# Patient Record
Sex: FEMALE | Race: WHITE | NOT HISPANIC OR LATINO | Employment: OTHER | URBAN - METROPOLITAN AREA
[De-identification: names, ages, dates, MRNs, and addresses within clinical notes are randomized per-mention and may not be internally consistent; named-entity substitution may affect disease eponyms.]

---

## 2019-11-06 ENCOUNTER — OFFICE VISIT (OUTPATIENT)
Dept: FAMILY MEDICINE CLINIC | Facility: HOSPITAL | Age: 84
End: 2019-11-06
Payer: COMMERCIAL

## 2019-11-06 VITALS
HEIGHT: 62 IN | WEIGHT: 159 LBS | SYSTOLIC BLOOD PRESSURE: 140 MMHG | TEMPERATURE: 97.3 F | BODY MASS INDEX: 29.26 KG/M2 | HEART RATE: 72 BPM | DIASTOLIC BLOOD PRESSURE: 78 MMHG

## 2019-11-06 DIAGNOSIS — E78.5 DYSLIPIDEMIA: ICD-10-CM

## 2019-11-06 DIAGNOSIS — Z01.818 PRE-OP EXAM: Primary | ICD-10-CM

## 2019-11-06 DIAGNOSIS — R94.31 ABNORMAL EKG: ICD-10-CM

## 2019-11-06 DIAGNOSIS — H25.13 AGE-RELATED NUCLEAR CATARACT OF BOTH EYES: ICD-10-CM

## 2019-11-06 DIAGNOSIS — Z23 ENCOUNTER FOR IMMUNIZATION: ICD-10-CM

## 2019-11-06 DIAGNOSIS — I10 ESSENTIAL HYPERTENSION: ICD-10-CM

## 2019-11-06 PROCEDURE — G0008 ADMIN INFLUENZA VIRUS VAC: HCPCS | Performed by: FAMILY MEDICINE

## 2019-11-06 PROCEDURE — 90662 IIV NO PRSV INCREASED AG IM: CPT | Performed by: FAMILY MEDICINE

## 2019-11-06 PROCEDURE — 99213 OFFICE O/P EST LOW 20 MIN: CPT | Performed by: FAMILY MEDICINE

## 2019-11-06 RX ORDER — LANOLIN ALCOHOL/MO/W.PET/CERES
1 CREAM (GRAM) TOPICAL
COMMUNITY
End: 2022-06-07 | Stop reason: ALTCHOICE

## 2019-11-06 RX ORDER — SIMVASTATIN 10 MG
1 TABLET ORAL
COMMUNITY
Start: 2015-01-15 | End: 2020-05-13 | Stop reason: SDUPTHER

## 2019-11-06 RX ORDER — LISINOPRIL 10 MG/1
1 TABLET ORAL DAILY
COMMUNITY
Start: 2015-01-15 | End: 2020-05-13 | Stop reason: SDUPTHER

## 2019-11-06 RX ORDER — DOCUSATE SODIUM 100 MG/1
100 CAPSULE, LIQUID FILLED ORAL
COMMUNITY
Start: 2017-11-02 | End: 2020-03-11 | Stop reason: ALTCHOICE

## 2019-11-06 NOTE — PROGRESS NOTES
Assessment/Plan:         Diagnoses and all orders for this visit:    Pre-op exam  -     POCT ECG    Age-related nuclear cataract of both eyes  Comments:  She is medically cleared for ctaract extraction and IOL first OS then OD  EKG abnormal but unchanged from tracing from 2014  Abnormal EKG  Comments:  Low voltage anterior leads, unchanged from 2014    Dyslipidemia  Comments:  Continue statin and recheck lipid profile in the New Year  Orders:  -     Lipid Panel with Direct LDL reflex; Future    Essential hypertension  Comments:  Excellent BP control  Orders:  -     CBC and differential; Future  -     Comprehensive metabolic panel; Future  -     TSH, 3rd generation with Free T4 reflex; Future    Encounter for immunization  -     influenza vaccine, [id number], high-dose, PF 0 5 mL (FLUZONE HIGH-DOSE)    Other orders  -     simvastatin (ZOCOR) 10 mg tablet; Take 1 tablet by mouth  -     lisinopril (ZESTRIL) 10 mg tablet; Take 1 tablet by mouth daily  -     Multiple Vitamins-Minerals (MULTIVITAMIN ADULT PO); Take 1 tablet by mouth  -     Methylsulfonylmethane 1000 MG CAPS; Take 1 tablet by mouth  -     docusate sodium (COLACE) 100 mg capsule; Take 100 mg by mouth  -     calcium citrate-vitamin D (CITRACAL+D) 315-200 MG-UNIT per tablet; Take 1 tablet by mouth  -     B COMPLEX VITAMINS PO; Take 1 tablet by mouth  -     aspirin 81 MG tablet; Take 1 tablet by mouth daily          Subjective:      Patient ID: Gracy Mccartney is a 80 y o  female  New patient preop for cataract surgery    Feeling well overall    To have OS first , then OD with Dr Angélica Cruz  Some incontinence but not overly bothersome  Offered trial of medication, deferred by her at this time   since last year    from cardiac conduction problem and she continues to grieve and carry upset        The following portions of the patient's history were reviewed and updated as appropriate: allergies, current medications, past family history, past medical history, past social history, past surgical history and problem list     Review of Systems   Constitutional: Negative for unexpected weight change  HENT: Negative  Eyes: Positive for visual disturbance  Respiratory: Negative  Cardiovascular: Negative  Gastrointestinal: Negative  Genitourinary: Positive for enuresis, frequency and urgency  Musculoskeletal: Negative  Neurological: Negative  Psychiatric/Behavioral: Negative  All other systems reviewed and are negative  Objective:      /78   Pulse 72   Temp (!) 97 3 °F (36 3 °C)   Ht 5' 2" (1 575 m)   Wt 72 1 kg (159 lb)   BMI 29 08 kg/m²          Physical Exam   Constitutional: She is oriented to person, place, and time  She appears well-developed and well-nourished  HENT:   Right Ear: External ear normal    Left Ear: External ear normal    Nose: Nose normal    Mouth/Throat: Oropharynx is clear and moist    Eyes:   Bilateral mature cataracts  Neck: No thyromegaly present  Cardiovascular: Normal rate, regular rhythm, normal heart sounds and intact distal pulses  Pulmonary/Chest: Effort normal and breath sounds normal    Abdominal: Soft  Bowel sounds are normal    Musculoskeletal: She exhibits no edema  Neurological: She is alert and oriented to person, place, and time  Skin: No rash noted  Psychiatric: She has a normal mood and affect  Her behavior is normal    Nursing note and vitals reviewed

## 2019-11-07 PROBLEM — R94.31 ABNORMAL EKG: Status: ACTIVE | Noted: 2019-11-07

## 2019-11-07 PROBLEM — Z23 ENCOUNTER FOR IMMUNIZATION: Status: ACTIVE | Noted: 2019-11-07

## 2019-11-07 PROCEDURE — 93000 ELECTROCARDIOGRAM COMPLETE: CPT | Performed by: FAMILY MEDICINE

## 2020-01-31 ENCOUNTER — OFFICE VISIT (OUTPATIENT)
Dept: FAMILY MEDICINE CLINIC | Facility: HOSPITAL | Age: 85
End: 2020-01-31
Payer: COMMERCIAL

## 2020-01-31 VITALS
TEMPERATURE: 97.5 F | BODY MASS INDEX: 29.63 KG/M2 | WEIGHT: 161 LBS | HEART RATE: 60 BPM | SYSTOLIC BLOOD PRESSURE: 120 MMHG | DIASTOLIC BLOOD PRESSURE: 72 MMHG | HEIGHT: 62 IN

## 2020-01-31 DIAGNOSIS — Z01.818 PRE-OP EXAM: ICD-10-CM

## 2020-01-31 DIAGNOSIS — H25.11 AGE-RELATED NUCLEAR CATARACT OF RIGHT EYE: Primary | ICD-10-CM

## 2020-01-31 DIAGNOSIS — I10 ESSENTIAL HYPERTENSION: ICD-10-CM

## 2020-01-31 PROCEDURE — 99213 OFFICE O/P EST LOW 20 MIN: CPT | Performed by: FAMILY MEDICINE

## 2020-01-31 PROCEDURE — 3078F DIAST BP <80 MM HG: CPT | Performed by: FAMILY MEDICINE

## 2020-01-31 PROCEDURE — 3074F SYST BP LT 130 MM HG: CPT | Performed by: FAMILY MEDICINE

## 2020-01-31 PROCEDURE — 1100F PTFALLS ASSESS-DOCD GE2>/YR: CPT | Performed by: FAMILY MEDICINE

## 2020-01-31 PROCEDURE — 1036F TOBACCO NON-USER: CPT | Performed by: FAMILY MEDICINE

## 2020-01-31 PROCEDURE — 3288F FALL RISK ASSESSMENT DOCD: CPT | Performed by: FAMILY MEDICINE

## 2020-01-31 PROCEDURE — 3725F SCREEN DEPRESSION PERFORMED: CPT | Performed by: FAMILY MEDICINE

## 2020-01-31 PROCEDURE — 1160F RVW MEDS BY RX/DR IN RCRD: CPT | Performed by: FAMILY MEDICINE

## 2020-01-31 NOTE — PROGRESS NOTES
BMI Counseling: Body mass index is 29 45 kg/m²  The BMI is above normal  Nutrition recommendations include reducing portion sizes, decreasing overall calorie intake and moderation in carbohydrate intake  Exercise recommendations include strength training exercises  Assessment/Plan:    1  Age-related nuclear cataract of right eye     Medically cleared for cat extraction OD with IOL by Dr Dionne Leger    2  Preop exam    3  Essential hypertension      Excellent BP control       addendum:   EKG done 11/6/19 was acceptable and does not need to be repeated  Subjective:      Patient ID: Jamia Redman is a 80 y o  female  For preop check for second cataract surgery OD with Dr Dionne Leger on 2/11/20    Had a very good experience with the OS surgery  No recent illness or injury          The following portions of the patient's history were reviewed and updated as appropriate: allergies, current medications, past family history, past medical history, past social history, past surgical history and problem list     Review of Systems   Constitutional: Negative for unexpected weight change  HENT: Negative  Eyes: Positive for visual disturbance  Respiratory: Negative  Cardiovascular: Negative  Gastrointestinal: Negative  Musculoskeletal: Negative  Neurological: Negative  Hematological: Negative  Psychiatric/Behavioral: Negative  All other systems reviewed and are negative  Objective:      /72   Pulse 60   Temp 97 5 °F (36 4 °C)   Ht 5' 2" (1 575 m)   Wt 73 kg (161 lb)   BMI 29 45 kg/m²          Physical Exam   Constitutional: She is oriented to person, place, and time  She appears well-developed and well-nourished  HENT:   Head: Normocephalic  Right Ear: External ear normal    Left Ear: External ear normal    Nose: Nose normal    Mouth/Throat: Oropharynx is clear and moist    Neck: No thyromegaly present  Cardiovascular: Normal rate, regular rhythm and normal heart sounds  Pulmonary/Chest: Effort normal and breath sounds normal    Musculoskeletal: She exhibits no edema  Neurological: She is alert and oriented to person, place, and time  Psychiatric: She has a normal mood and affect  Her behavior is normal  Judgment and thought content normal    Nursing note and vitals reviewed

## 2020-03-11 ENCOUNTER — OFFICE VISIT (OUTPATIENT)
Dept: FAMILY MEDICINE CLINIC | Facility: HOSPITAL | Age: 85
End: 2020-03-11
Payer: COMMERCIAL

## 2020-03-11 VITALS
HEART RATE: 70 BPM | DIASTOLIC BLOOD PRESSURE: 84 MMHG | SYSTOLIC BLOOD PRESSURE: 142 MMHG | HEIGHT: 62 IN | TEMPERATURE: 97.9 F | BODY MASS INDEX: 29.81 KG/M2 | WEIGHT: 162 LBS

## 2020-03-11 DIAGNOSIS — E78.5 DYSLIPIDEMIA: ICD-10-CM

## 2020-03-11 DIAGNOSIS — Z00.00 MEDICARE ANNUAL WELLNESS VISIT, SUBSEQUENT: Primary | ICD-10-CM

## 2020-03-11 DIAGNOSIS — I10 ESSENTIAL HYPERTENSION: ICD-10-CM

## 2020-03-11 PROCEDURE — 3079F DIAST BP 80-89 MM HG: CPT | Performed by: FAMILY MEDICINE

## 2020-03-11 PROCEDURE — 3077F SYST BP >= 140 MM HG: CPT | Performed by: FAMILY MEDICINE

## 2020-03-11 PROCEDURE — G0439 PPPS, SUBSEQ VISIT: HCPCS | Performed by: FAMILY MEDICINE

## 2020-03-11 PROCEDURE — 1160F RVW MEDS BY RX/DR IN RCRD: CPT | Performed by: FAMILY MEDICINE

## 2020-03-11 PROCEDURE — 1125F AMNT PAIN NOTED PAIN PRSNT: CPT | Performed by: FAMILY MEDICINE

## 2020-03-11 PROCEDURE — 4040F PNEUMOC VAC/ADMIN/RCVD: CPT | Performed by: FAMILY MEDICINE

## 2020-03-11 PROCEDURE — 1170F FXNL STATUS ASSESSED: CPT | Performed by: FAMILY MEDICINE

## 2020-03-11 PROCEDURE — 1036F TOBACCO NON-USER: CPT | Performed by: FAMILY MEDICINE

## 2020-03-11 RX ORDER — KETOROLAC TROMETHAMINE 5 MG/ML
SOLUTION OPHTHALMIC
COMMUNITY
Start: 2020-02-03 | End: 2022-06-07 | Stop reason: ALTCHOICE

## 2020-03-11 NOTE — PROGRESS NOTES
Assessment/Plan:         Diagnoses and all orders for this visit:    Medicare annual wellness visit, subsequent    Dyslipidemia  -     Lipid Panel with Direct LDL reflex; Future  -     Lipid Panel with Direct LDL reflex    Essential hypertension  -     CBC and differential; Future  -     Comprehensive metabolic panel; Future  -     TSH, 3rd generation with Free T4 reflex; Future  -     CBC and differential  -     Comprehensive metabolic panel  -     TSH, 3rd generation with Free T4 reflex    Other orders  -     ketorolac (ACULAR) 0 5 % ophthalmic solution  -     Multiple Vitamins-Minerals (PRESERVISION AREDS PO); Take by mouth 2 (two) times a day          Subjective:      Patient ID: Dedra Miranda is a 80 y o  female  Medicare well visit    Had both cataracts surgically done  Awaiting glasses     No recent illness or injury  Knees are main problem but not up for surgery  The following portions of the patient's history were reviewed and updated as appropriate: allergies, current medications, past family history, past medical history, past social history, past surgical history and problem list     Review of Systems      Objective:      /84   Pulse 70   Temp 97 9 °F (36 6 °C)   Ht 5' 2" (1 575 m)   Wt 73 5 kg (162 lb)   BMI 29 63 kg/m²          Physical Exam   Constitutional: She is oriented to person, place, and time  She appears well-developed and well-nourished  HENT:   Head: Normocephalic and atraumatic  Right Ear: External ear normal    Left Ear: External ear normal    Neck: No thyromegaly present  Cardiovascular: Normal rate, regular rhythm, normal heart sounds and intact distal pulses  Pulmonary/Chest: Effort normal and breath sounds normal    Neurological: She is alert and oriented to person, place, and time  Psychiatric: She has a normal mood and affect  Her behavior is normal  Thought content normal    Nursing note and vitals reviewed

## 2020-03-11 NOTE — PATIENT INSTRUCTIONS
Medicare Preventive Visit Patient Instructions  Thank you for completing your Welcome to Medicare Visit or Medicare Annual Wellness Visit today  Your next wellness visit will be due in one year (3/11/2021)  The screening/preventive services that you may require over the next 5-10 years are detailed below  Some tests may not apply to you based off risk factors and/or age  Screening tests ordered at today's visit but not completed yet may show as past due  Also, please note that scanned in results may not display below  Preventive Screenings:  Service Recommendations Previous Testing/Comments   Colorectal Cancer Screening  * Colonoscopy    * Fecal Occult Blood Test (FOBT)/Fecal Immunochemical Test (FIT)  * Fecal DNA/Cologuard Test  * Flexible Sigmoidoscopy Age: 54-65 years old   Colonoscopy: every 10 years (may be performed more frequently if at higher risk)  OR  FOBT/FIT: every 1 year  OR  Cologuard: every 3 years  OR  Sigmoidoscopy: every 5 years  Screening may be recommended earlier than age 48 if at higher risk for colorectal cancer  Also, an individualized decision between you and your healthcare provider will decide whether screening between the ages of 74-80 would be appropriate  Colonoscopy: Not on file  FOBT/FIT: Not on file  Cologuard: Not on file  Sigmoidoscopy: Not on file         Breast Cancer Screening Age: 36 years old  Frequency: every 1-2 years  Not required if history of left and right mastectomy Mammogram: Not on file    History Breast Cancer   Cervical Cancer Screening Between the ages of 21-29, pap smear recommended once every 3 years  Between the ages of 33-67, can perform pap smear with HPV co-testing every 5 years     Recommendations may differ for women with a history of total hysterectomy, cervical cancer, or abnormal pap smears in past  Pap Smear: Not on file    Screening Not Indicated   Hepatitis C Screening Once for adults born between 1945 and 1965  More frequently in patients at high risk for Hepatitis C Hep C Antibody: Not on file       Diabetes Screening 1-2 times per year if you're at risk for diabetes or have pre-diabetes Fasting glucose: No results in last 5 years   A1C: No results in last 5 years       Cholesterol Screening Once every 5 years if you don't have a lipid disorder  May order more often based on risk factors  Lipid panel: Not on file         Other Preventive Screenings Covered by Medicare:  1  Abdominal Aortic Aneurysm (AAA) Screening: covered once if your at risk  You're considered to be at risk if you have a family history of AAA  2  Lung Cancer Screening: covers low dose CT scan once per year if you meet all of the following conditions: (1) Age 50-69; (2) No signs or symptoms of lung cancer; (3) Current smoker or have quit smoking within the last 15 years; (4) You have a tobacco smoking history of at least 30 pack years (packs per day multiplied by number of years you smoked); (5) You get a written order from a healthcare provider  3  Glaucoma Screening: covered annually if you're considered high risk: (1) You have diabetes OR (2) Family history of glaucoma OR (3)  aged 48 and older OR (3)  American aged 72 and older  3  Osteoporosis Screening: covered every 2 years if you meet one of the following conditions: (1) You're estrogen deficient and at risk for osteoporosis based off medical history and other findings; (2) Have a vertebral abnormality; (3) On glucocorticoid therapy for more than 3 months; (4) Have primary hyperparathyroidism; (5) On osteoporosis medications and need to assess response to drug therapy  · Last bone density test (DXA Scan): Not on file  5  HIV Screening: covered annually if you're between the age of 12-76  Also covered annually if you are younger than 13 and older than 72 with risk factors for HIV infection  For pregnant patients, it is covered up to 3 times per pregnancy      Immunizations:  Immunization Recommendations   Influenza Vaccine Annual influenza vaccination during flu season is recommended for all persons aged >= 6 months who do not have contraindications   Pneumococcal Vaccine (Prevnar and Pneumovax)  * Prevnar = PCV13  * Pneumovax = PPSV23   Adults 25-60 years old: 1-3 doses may be recommended based on certain risk factors  Adults 72 years old: Prevnar (PCV13) vaccine recommended followed by Pneumovax (PPSV23) vaccine  If already received PPSV23 since turning 65, then PCV13 recommended at least one year after PPSV23 dose  Hepatitis B Vaccine 3 dose series if at intermediate or high risk (ex: diabetes, end stage renal disease, liver disease)   Tetanus (Td) Vaccine - COST NOT COVERED BY MEDICARE PART B Following completion of primary series, a booster dose should be given every 10 years to maintain immunity against tetanus  Td may also be given as tetanus wound prophylaxis  Tdap Vaccine - COST NOT COVERED BY MEDICARE PART B Recommended at least once for all adults  For pregnant patients, recommended with each pregnancy  Shingles Vaccine (Shingrix) - COST NOT COVERED BY MEDICARE PART B  2 shot series recommended in those aged 48 and above     Health Maintenance Due:      Topic Date Due    DXA SCAN  09/11/2020 (Originally 1935)     Immunizations Due:  There are no preventive care reminders to display for this patient  Advance Directives   What are advance directives? Advance directives are legal documents that state your wishes and plans for medical care  These plans are made ahead of time in case you lose your ability to make decisions for yourself  Advance directives can apply to any medical decision, such as the treatments you want, and if you want to donate organs  What are the types of advance directives? There are many types of advance directives, and each state has rules about how to use them  You may choose a combination of any of the following:  · Living will:   This is a written record of the treatment you want  You can also choose which treatments you do not want, which to limit, and which to stop at a certain time  This includes surgery, medicine, IV fluid, and tube feedings  · Durable power of  for healthcare [facility]): This is a written record that states who you want to make healthcare choices for you when you are unable to make them for yourself  This person, called a proxy, is usually a family member or a friend  You may choose more than 1 proxy  · Do not resuscitate (DNR) order:  A DNR order is used in case your heart stops beating or you stop breathing  It is a request not to have certain forms of treatment, such as CPR  A DNR order may be included in other types of advance directives  · Medical directive: This covers the care that you want if you are in a coma, near death, or unable to make decisions for yourself  You can list the treatments you want for each condition  Treatment may include pain medicine, surgery, blood transfusions, dialysis, IV or tube feedings, and a ventilator (breathing machine)  · Values history: This document has questions about your views, beliefs, and how you feel and think about life  This information can help others choose the care that you would choose  Why are advance directives important? An advance directive helps you control your care  Although spoken wishes may be used, it is better to have your wishes written down  Spoken wishes can be misunderstood, or not followed  Treatments may be given even if you do not want them  An advance directive may make it easier for your family to make difficult choices about your care  Urinary Incontinence   Urinary incontinence (UI)  is when you lose control of your bladder  UI develops because your bladder cannot store or empty urine properly  The 3 most common types of UI are stress incontinence, urge incontinence, or both  Medicines:   · May be given to help strengthen your bladder control  Report any side effects of medication to your healthcare provider  Do pelvic muscle exercises often:  Your pelvic muscles help you stop urinating  Squeeze these muscles tight for 5 seconds, then relax for 5 seconds  Gradually work up to squeezing for 10 seconds  Do 3 sets of 15 repetitions a day, or as directed  This will help strengthen your pelvic muscles and improve bladder control  Train your bladder:  Go to the bathroom at set times, such as every 2 hours, even if you do not feel the urge to go  You can also try to hold your urine when you feel the urge to go  For example, hold your urine for 5 minutes when you feel the urge to go  As that becomes easier, hold your urine for 10 minutes  Self-care:   · Keep a UI record  Write down how often you leak urine and how much you leak  Make a note of what you were doing when you leaked urine  · Drink liquids as directed  You may need to limit the amount of liquid you drink to help control your urine leakage  Do not drink any liquid right before you go to bed  Limit or do not have drinks that contain caffeine or alcohol  · Prevent constipation  Eat a variety of high-fiber foods  Good examples are high-fiber cereals, beans, vegetables, and whole-grain breads  Walking is the best way to trigger your intestines to have a bowel movement  · Exercise regularly and maintain a healthy weight  Weight loss and exercise will decrease pressure on your bladder and help you control your leakage  · Use a catheter as directed  to help empty your bladder  A catheter is a tiny, plastic tube that is put into your bladder to drain your urine  · Go to behavior therapy as directed  Behavior therapy may be used to help you learn to control your urge to urinate  Weight Management   Why it is important to manage your weight:  Being overweight increases your risk of health conditions such as heart disease, high blood pressure, type 2 diabetes, and certain types of cancer   It can also increase your risk for osteoarthritis, sleep apnea, and other respiratory problems  Aim for a slow, steady weight loss  Even a small amount of weight loss can lower your risk of health problems  How to lose weight safely:  A safe and healthy way to lose weight is to eat fewer calories and get regular exercise  You can lose up about 1 pound a week by decreasing the number of calories you eat by 500 calories each day  Healthy meal plan for weight management:  A healthy meal plan includes a variety of foods, contains fewer calories, and helps you stay healthy  A healthy meal plan includes the following:  · Eat whole-grain foods more often  A healthy meal plan should contain fiber  Fiber is the part of grains, fruits, and vegetables that is not broken down by your body  Whole-grain foods are healthy and provide extra fiber in your diet  Some examples of whole-grain foods are whole-wheat breads and pastas, oatmeal, brown rice, and bulgur  · Eat a variety of vegetables every day  Include dark, leafy greens such as spinach, kale, chandler greens, and mustard greens  Eat yellow and orange vegetables such as carrots, sweet potatoes, and winter squash  · Eat a variety of fruits every day  Choose fresh or canned fruit (canned in its own juice or light syrup) instead of juice  Fruit juice has very little or no fiber  · Eat low-fat dairy foods  Drink fat-free (skim) milk or 1% milk  Eat fat-free yogurt and low-fat cottage cheese  Try low-fat cheeses such as mozzarella and other reduced-fat cheeses  · Choose meat and other protein foods that are low in fat  Choose beans or other legumes such as split peas or lentils  Choose fish, skinless poultry (chicken or turkey), or lean cuts of red meat (beef or pork)  Before you cook meat or poultry, cut off any visible fat  · Use less fat and oil  Try baking foods instead of frying them   Add less fat, such as margarine, sour cream, regular salad dressing and mayonnaise to foods  Eat fewer high-fat foods  Some examples of high-fat foods include french fries, doughnuts, ice cream, and cakes  · Eat fewer sweets  Limit foods and drinks that are high in sugar  This includes candy, cookies, regular soda, and sweetened drinks  Exercise:  Exercise at least 30 minutes per day on most days of the week  Some examples of exercise include walking, biking, dancing, and swimming  You can also fit in more physical activity by taking the stairs instead of the elevator or parking farther away from stores  Ask your healthcare provider about the best exercise plan for you  © Copyright [employer] 2018 Information is for End User's use only and may not be sold, redistributed or otherwise used for commercial purposes  All illustrations and images included in CareNotes® are the copyrighted property of [employer]  or [employer] Visit for Adults   AMBULATORY CARE:   A wellness visit  is when you see your healthcare provider to get screened for health problems  You can also get advice on how to stay healthy  Write down your questions so you remember to ask them  Ask your healthcare provider how often you should have a wellness visit  What happens at a wellness visit:  Your healthcare provider will ask about your health, and your family history of health problems  This includes high blood pressure, heart disease, and cancer  He or she will ask if you have symptoms that concern you, if you smoke, and about your mood  You may also be asked about your intake of medicines, supplements, food, and alcohol  Any of the following may be done:  · Your weight  will be checked  Your height may also be checked so your body mass index (BMI) can be calculated  Your BMI shows if you are at a healthy weight  · Your blood pressure  and heart rate will be checked  Your temperature may also be checked  · Blood and urine tests  may be done   Blood tests may be done to check your cholesterol levels  Abnormal cholesterol levels increase your risk for heart disease and stroke  You may also need a blood or urine test to check for diabetes if you are at increased risk  Urine tests may be done to look for signs of an infection or kidney disease  · A physical exam  includes checking your heartbeat and lungs with a stethoscope  Your healthcare provider may also check your skin to look for sun damage  · Screening tests  may be recommended  A screening test is done to check for diseases that may not cause symptoms  The screening tests you may need depend on your age, gender, family history, and lifestyle habits  For example, colorectal screening may be recommended if you are 48years old or older  Screening tests you need if you are a woman:   · A Pap smear  is used to screen for cervical cancer  Pap smears are usually done every 3 to 5 years depending on your age  You may need them more often if you have had abnormal Pap smear test results in the past  Ask your healthcare provider how often you should have a Pap smear  · A mammogram  is an x-ray of your breasts to screen for breast cancer  Experts recommend mammograms every 2 years starting at age 48 years  You may need a mammogram at age 52 years or younger if you have an increased risk for breast cancer  Talk to your healthcare provider about when you should start having mammograms and how often you need them  Vaccines you may need:   · Get an influenza vaccine  every year  The influenza vaccine protects you from the flu  Several types of viruses cause the flu  The viruses change over time, so new vaccines are made each year  · Get a tetanus-diphtheria (Td) booster vaccine  every 10 years  This vaccine protects you against tetanus and diphtheria  Tetanus is a severe infection that may cause painful muscle spasms and lockjaw   Diphtheria is a severe bacterial infection that causes a thick covering in the back of your mouth and throat  · Get a human papillomavirus (HPV) vaccine  if you are female and aged 23 to 32 or male 23 to 24 and never received it  This vaccine protects you from HPV infection  HPV is the most common infection spread by sexual contact  HPV may also cause vaginal, penile, and anal cancers  · Get a pneumococcal vaccine  if you are aged 72 years or older  The pneumococcal vaccine is an injection given to protect you from pneumococcal disease  Pneumococcal disease is an infection caused by pneumococcal bacteria  The infection may cause pneumonia, meningitis, or an ear infection  · Get a shingles vaccine  if you are aged 61 or older, even if you have had shingles before  The shingles vaccine is an injection to protect you from the varicella-zoster virus  This is the same virus that causes chickenpox  Shingles is a painful rash that develops in people who had chickenpox or have been exposed to the virus  How to eat healthy:  My Plate is a model for planning healthy meals  It shows the types and amounts of foods that should go on your plate  Fruits and vegetables make up about half of your plate, and grains and protein make up the other half  A serving of dairy is included on the side of your plate  The amount of calories and serving sizes you need depends on your age, gender, weight, and height  Examples of healthy foods are listed below:  · Eat a variety of vegetables  such as dark green, red, and orange vegetables  You can also include canned vegetables low in sodium (salt) and frozen vegetables without added butter or sauces  · Eat a variety of fresh fruits , canned fruit in 100% juice, frozen fruit, and dried fruit  · Include whole grains  At least half of the grains you eat should be whole grains   Examples include whole-wheat bread, wheat pasta, brown rice, and whole-grain cereals such as oatmeal     · Eat a variety of protein foods such as seafood (fish and shellfish), lean meat, and poultry without skin (turkey and chicken)  Examples of lean meats include pork leg, shoulder, or tenderloin, and beef round, sirloin, tenderloin, and extra lean ground beef  Other protein foods include eggs and egg substitutes, beans, peas, soy products, nuts, and seeds  · Choose low-fat dairy products such as skim or 1% milk or low-fat yogurt, cheese, and cottage cheese  · Limit unhealthy fats  such as butter, hard margarine, and shortening  Exercise:  Exercise at least 30 minutes per day on most days of the week  Some examples of exercise include walking, biking, dancing, and swimming  You can also fit in more physical activity by taking the stairs instead of the elevator or parking farther away from stores  Include muscle strengthening activities 2 days each week  Regular exercise provides many health benefits  It helps you manage your weight, and decreases your risk for type 2 diabetes, heart disease, stroke, and high blood pressure  Exercise can also help improve your mood  Ask your healthcare provider about the best exercise plan for you  General health and safety guidelines:   · Do not smoke  Nicotine and other chemicals in cigarettes and cigars can cause lung damage  Ask your healthcare provider for information if you currently smoke and need help to quit  E-cigarettes or smokeless tobacco still contain nicotine  Talk to your healthcare provider before you use these products  · Limit alcohol  A drink of alcohol is 12 ounces of beer, 5 ounces of wine, or 1½ ounces of liquor  · Lose weight, if needed  Being overweight increases your risk of certain health conditions  These include heart disease, high blood pressure, type 2 diabetes, and certain types of cancer  · Protect your skin  Do not sunbathe or use tanning beds  Use sunscreen with a SPF 15 or higher  Apply sunscreen at least 15 minutes before you go outside  Reapply sunscreen every 2 hours   Wear protective clothing, hats, and sunglasses when you are outside  · Drive safely  Always wear your seatbelt  Make sure everyone in your car wears a seatbelt  A seatbelt can save your life if you are in an accident  Do not use your cell phone when you are driving  This could distract you and cause an accident  Pull over if you need to make a call or send a text message  · Practice safe sex  Use latex condoms if are sexually active and have more than one partner  Your healthcare provider may recommend screening tests for sexually transmitted infections (STIs)  · Wear helmets, lifejackets, and protective gear  Always wear a helmet when you ride a bike or motorcycle, go skiing, or play sports that could cause a head injury  Wear protective equipment when you play sports  Wear a lifejacket when you are on a boat or doing water sports  © 2017 [address] Information is for End User's use only and may not be sold, redistributed or otherwise used for commercial purposes  All illustrations and images included in CareNotes® are the copyrighted property of [employer]  or [employer]  The above information is an  only  It is not intended as medical advice for individual conditions or treatments  Talk to your doctor, nurse or pharmacist before following any medical regimen to see if it is safe and effective for you

## 2020-03-11 NOTE — PROGRESS NOTES
Assessment and Plan:     Problem List Items Addressed This Visit        Cardiovascular and Mediastinum    Essential hypertension    Relevant Orders    CBC and differential    Comprehensive metabolic panel    TSH, 3rd generation with Free T4 reflex       Other    Dyslipidemia    Relevant Orders    Lipid Panel with Direct LDL reflex    Medicare annual wellness visit, subsequent - Primary          Falls Plan of Care: balance, strength, and gait training instructions were provided  Preventive health issues were discussed with patient, and age appropriate screening tests were ordered as noted in patient's After Visit Summary  Personalized health advice and appropriate referrals for health education or preventive services given if needed, as noted in patient's After Visit Summary       History of Present Illness:     Patient presents for Medicare Annual Wellness visit    Patient Care Team:  Jayleen Hand MD as PCP - General (Family Medicine)  Reginald Miner MD     Problem List:     Patient Active Problem List   Diagnosis    Dyslipidemia    Essential hypertension    Pre-op exam    Age-related nuclear cataract of right eye    Abnormal EKG    Medicare annual wellness visit, subsequent      Past Medical and Surgical History:     Past Medical History:   Diagnosis Date    Breast cancer ([address] )     Right sided     Past Surgical History:   Procedure Laterality Date    BREAST SURGERY      EYE SURGERY  11/19/2019    Left cataract       Family History:     Family History   Problem Relation Age of Onset    No Known Problems Family     Coronary artery disease Father       Social History:        Social History     Socioeconomic History    Marital status: /Civil Union     Spouse name: None    Number of children: None    Years of education: None    Highest education level: None   Occupational History    None   Social Needs    Financial resource strain: None    Food insecurity:     Worry: None Inability: None    Transportation needs:     Medical: None     Non-medical: None   Tobacco Use    Smoking status: Never Smoker    Smokeless tobacco: Never Used    Tobacco comment: Nonsmoker - As per [employer]    Substance and Sexual Activity    Alcohol use: Yes     Comment: Red wine daily    Drug use: Never    Sexual activity: None   Lifestyle    Physical activity:     Days per week: None     Minutes per session: None    Stress: None   Relationships    Social connections:     Talks on phone: None     Gets together: None     Attends Hinduism service: None     Active member of club or organization: None     Attends meetings of clubs or organizations: None     Relationship status: None    Intimate partner violence:     Fear of current or ex partner: None     Emotionally abused: None     Physically abused: None     Forced sexual activity: None   Other Topics Concern    None   Social History Narrative    None      Medications and Allergies:     Current Outpatient Medications   Medication Sig Dispense Refill    aspirin 81 MG tablet Take 1 tablet by mouth daily      B COMPLEX VITAMINS PO Take 1 tablet by mouth      calcium citrate-vitamin D (CITRACAL+D) 315-200 MG-UNIT per tablet Take 1 tablet by mouth      ketorolac (ACULAR) 0 5 % ophthalmic solution       lisinopril (ZESTRIL) 10 mg tablet Take 1 tablet by mouth daily      Multiple Vitamins-Minerals (MULTIVITAMIN ADULT PO) Take 1 tablet by mouth      Multiple Vitamins-Minerals (PRESERVISION AREDS PO) Take by mouth 2 (two) times a day      simvastatin (ZOCOR) 10 mg tablet Take 1 tablet by mouth      Methylsulfonylmethane 1000 MG CAPS Take 1 tablet by mouth       No current facility-administered medications for this visit        Allergies   Allergen Reactions    Mushroom Extract Complex Other (See Comments)     syncope      Immunizations:     Immunization History   Administered Date(s) Administered    INFLUENZA 11/03/2009, 12/02/2010, 11/07/2011, 10/26/2012, 09/09/2014, 11/18/2015, 10/11/2016, 11/03/2017    Influenza, high dose seasonal 0 5 mL 11/06/2019    Pneumococcal Conjugate 13-Valent 11/14/2016    Pneumococcal Polysaccharide PPV23 12/02/2010    Tuberculin Skin Test-PPD Intradermal 12/07/2010      Health Maintenance:         Topic Date Due    DXA SCAN  09/11/2020 (Originally 1935)     There are no preventive care reminders to display for this patient  Medicare Health Risk Assessment:     /84   Pulse 70   Temp 97 9 °F (36 6 °C)   Ht 5' 2" (1 575 m)   Wt 73 5 kg (162 lb)   BMI 29 63 kg/m²      Ge Hagen is here for her Subsequent Wellness visit  Health Risk Assessment:   Patient rates overall health as good  Patient feels that their physical health rating is same  Eyesight was rated as same  Hearing was rated as same  Patient feels that their emotional and mental health rating is same  Pain experienced in the last 7 days has been none  Patient states that she has experienced no weight loss or gain in last 6 months  Depression Screening:   PHQ-2 Score: 2      Fall Risk Screening: In the past year, patient has experienced: no history of falling in past year      Urinary Incontinence Screening:   Patient has leaked urine accidently in the last six months  Home Safety:  Patient has trouble with stairs inside or outside of their home  Patient has working smoke alarms and has working carbon monoxide detector  Home safety hazards include: none  Nutrition:   Current diet is Regular and Low Carb  Medications:   Patient is currently taking over-the-counter supplements  OTC medications include: see medication list  Patient is able to manage medications  Activities of Daily Living (ADLs)/Instrumental Activities of Daily Living (IADLs):   Walk and transfer into and out of bed and chair?: Yes  Dress and groom yourself?: Yes    Bathe or shower yourself?: Yes    Feed yourself?  Yes  Do your laundry/housekeeping?: Yes  Manage your money, pay your bills and track your expenses?: Yes  Make your own meals?: Yes    Do your own shopping?: Yes    ADL comments: Son helps with cooking, cleaning, and shopping      Previous Hospitalizations:   Any hospitalizations or ED visits within the last 12 months?: No      Advance Care Planning:   Living will: Yes    Durable POA for healthcare: No    Advanced directive: Yes    Advanced directive counseling given: No    Five wishes given: No    Patient declined ACP directive: No    End of Life Decisions reviewed with patient: Yes    Provider agrees with end of life decisions: Yes      Cognitive Screening:   Provider or family/friend/caregiver concerned regarding cognition?: No    PREVENTIVE SCREENINGS      Cardiovascular Screening:    General: Screening Current      Diabetes Screening:     General: Risks and Benefits Discussed    Due for: Blood Glucose      Colorectal Cancer Screening:     General: Screening Not Indicated      Breast Cancer Screening:     General: History Breast Cancer      Cervical Cancer Screening:    General: Screening Not Indicated      Osteoporosis Screening:    General: Screening Current      Abdominal Aortic Aneurysm (AAA) Screening:        General: Screening Not Indicated      Lung Cancer Screening:     General: Screening Not Indicated      Hepatitis C Screening:    General: Screening Not Indicated      Lenin Hernandez MD

## 2020-05-13 DIAGNOSIS — I10 ESSENTIAL HYPERTENSION: ICD-10-CM

## 2020-05-13 DIAGNOSIS — E78.5 DYSLIPIDEMIA: Primary | ICD-10-CM

## 2020-05-13 RX ORDER — LISINOPRIL 10 MG/1
10 TABLET ORAL DAILY
Qty: 90 TABLET | Refills: 1 | Status: SHIPPED | OUTPATIENT
Start: 2020-05-13 | End: 2021-03-25 | Stop reason: SDUPTHER

## 2020-05-13 RX ORDER — SIMVASTATIN 10 MG
10 TABLET ORAL DAILY
Qty: 90 TABLET | Refills: 1 | Status: SHIPPED | OUTPATIENT
Start: 2020-05-13 | End: 2021-03-25 | Stop reason: SDUPTHER

## 2020-07-28 PROBLEM — K58.9 COLON SPASM: Status: ACTIVE | Noted: 2020-07-28

## 2021-03-25 DIAGNOSIS — E78.5 DYSLIPIDEMIA: ICD-10-CM

## 2021-03-25 DIAGNOSIS — I10 ESSENTIAL HYPERTENSION: ICD-10-CM

## 2021-03-25 RX ORDER — SIMVASTATIN 10 MG
10 TABLET ORAL DAILY
Qty: 90 TABLET | Refills: 1 | Status: SHIPPED | OUTPATIENT
Start: 2021-03-25 | End: 2021-09-26

## 2021-03-25 RX ORDER — LISINOPRIL 10 MG/1
10 TABLET ORAL DAILY
Qty: 90 TABLET | Refills: 1 | Status: SHIPPED | OUTPATIENT
Start: 2021-03-25 | End: 2021-09-26

## 2021-03-29 ENCOUNTER — IMMUNIZATIONS (OUTPATIENT)
Dept: FAMILY MEDICINE CLINIC | Facility: HOSPITAL | Age: 86
End: 2021-03-29

## 2021-03-29 DIAGNOSIS — Z23 ENCOUNTER FOR IMMUNIZATION: Primary | ICD-10-CM

## 2021-03-29 PROCEDURE — 91300 SARS-COV-2 / COVID-19 MRNA VACCINE (PFIZER-BIONTECH) 30 MCG: CPT

## 2021-03-29 PROCEDURE — 0001A SARS-COV-2 / COVID-19 MRNA VACCINE (PFIZER-BIONTECH) 30 MCG: CPT

## 2021-04-19 ENCOUNTER — IMMUNIZATIONS (OUTPATIENT)
Dept: FAMILY MEDICINE CLINIC | Facility: HOSPITAL | Age: 86
End: 2021-04-19

## 2021-04-19 DIAGNOSIS — Z23 ENCOUNTER FOR IMMUNIZATION: Primary | ICD-10-CM

## 2021-04-19 PROCEDURE — 0002A SARS-COV-2 / COVID-19 MRNA VACCINE (PFIZER-BIONTECH) 30 MCG: CPT

## 2021-04-19 PROCEDURE — 91300 SARS-COV-2 / COVID-19 MRNA VACCINE (PFIZER-BIONTECH) 30 MCG: CPT

## 2021-04-26 ENCOUNTER — TELEPHONE (OUTPATIENT)
Dept: FAMILY MEDICINE CLINIC | Facility: HOSPITAL | Age: 86
End: 2021-04-26

## 2021-04-26 DIAGNOSIS — M17.12 ARTHRITIS OF LEFT KNEE: Primary | ICD-10-CM

## 2021-04-26 NOTE — TELEPHONE ENCOUNTER
unfortunately Dr Jesenia Maria doesn't have xray ordering privileges and they cant see her until an xray of the l knee is done, could we order?

## 2021-04-26 NOTE — TELEPHONE ENCOUNTER
Pt asking if she should go to see Dr Jonathon Wright)   Asked about an ortho doctor     Looking for recommendation          Please advise  thanks

## 2021-04-26 NOTE — TELEPHONE ENCOUNTER
l Knee problem    Hurts when walking    Not unbearable pain    No injury    Swollen not red rates 7/10 when walking    Started last week    Mild pain when sitting     Wants to know what we advise, if she should make an apt with Kee Needle, a chiropractor, or someone else

## 2021-04-26 NOTE — TELEPHONE ENCOUNTER
That is a fine idea to see Dr Hyacinth Rios    If not good enough relief after 10 days or so I can send to Ortho

## 2021-04-27 ENCOUNTER — HOSPITAL ENCOUNTER (OUTPATIENT)
Dept: RADIOLOGY | Facility: HOSPITAL | Age: 86
Discharge: HOME | End: 2021-04-27
Payer: COMMERCIAL

## 2021-04-27 DIAGNOSIS — M17.12 ARTHRITIS OF LEFT KNEE: ICD-10-CM

## 2021-04-27 PROCEDURE — 73562 X-RAY EXAM OF KNEE 3: CPT

## 2021-05-13 ENCOUNTER — TELEPHONE (OUTPATIENT)
Dept: FAMILY MEDICINE CLINIC | Facility: HOSPITAL | Age: 86
End: 2021-05-13

## 2021-05-13 NOTE — TELEPHONE ENCOUNTER
Patient called stating she has been seeing Dr Michelle Nieves (chiro)  Dr Myrtle Brush has a treatment she would like Kaylynn Dura to start  Kaylynn Kaleigh is asking us to authorize this treatment thru her [employer]      Left message for Dr Myrtle Brush office, asking for a call back to get more details of this request

## 2021-05-13 NOTE — TELEPHONE ENCOUNTER
Pt reports she wants to get regenerative therapy on her     LEFT serge   Pt reports she called [facility]   They said     if both Dr Natacha Lauren and [facility] agree this is a medical     service needed they will pay for the therapy  Please     Advise   Thanks

## 2021-05-17 NOTE — TELEPHONE ENCOUNTER
Patient aware that per Dr Catherine Fong this is not a covered service and would not be submitted to insurance  Advised to contact Dr Michael Grimes w/ any further questions

## 2021-05-24 DIAGNOSIS — M17.12 LOCALIZED OSTEOARTHRITIS OF LEFT KNEE: Primary | ICD-10-CM

## 2021-05-24 NOTE — TELEPHONE ENCOUNTER
Advise evaluation with Ortho- Dr Jessica Pringle or Dr Celestino Krueger at [facility]  I can enter referral if she would like

## 2021-05-24 NOTE — TELEPHONE ENCOUNTER
Patient called asking if there is anything else that could be done with her knee?   She doesn't want to have surgery - can she come in and get a pain shot?  pcb

## 2021-05-27 ENCOUNTER — OFFICE VISIT (OUTPATIENT)
Dept: OBGYN CLINIC | Facility: CLINIC | Age: 86
End: 2021-05-27
Payer: COMMERCIAL

## 2021-05-27 VITALS
DIASTOLIC BLOOD PRESSURE: 76 MMHG | HEIGHT: 62 IN | BODY MASS INDEX: 30.18 KG/M2 | WEIGHT: 164 LBS | SYSTOLIC BLOOD PRESSURE: 120 MMHG

## 2021-05-27 DIAGNOSIS — M17.12 LOCALIZED OSTEOARTHRITIS OF LEFT KNEE: Primary | ICD-10-CM

## 2021-05-27 PROCEDURE — 1036F TOBACCO NON-USER: CPT | Performed by: ORTHOPAEDIC SURGERY

## 2021-05-27 PROCEDURE — 99213 OFFICE O/P EST LOW 20 MIN: CPT | Performed by: ORTHOPAEDIC SURGERY

## 2021-05-27 PROCEDURE — 1160F RVW MEDS BY RX/DR IN RCRD: CPT | Performed by: ORTHOPAEDIC SURGERY

## 2021-05-27 PROCEDURE — 20610 DRAIN/INJ JOINT/BURSA W/O US: CPT | Performed by: ORTHOPAEDIC SURGERY

## 2021-05-27 RX ADMIN — BUPIVACAINE HYDROCHLORIDE 4 ML: 2.5 INJECTION, SOLUTION INFILTRATION; PERINEURAL at 15:50

## 2021-05-27 RX ADMIN — METHYLPREDNISOLONE ACETATE 1 ML: 40 INJECTION, SUSPENSION INTRA-ARTICULAR; INTRALESIONAL; INTRAMUSCULAR; SOFT TISSUE at 15:50

## 2021-05-27 NOTE — PATIENT INSTRUCTIONS
Knee Pain / Arthritis Treatment Recommendations    Strengthening Exercises  10 repetitions each leg Monday, Wednesday, Friday OR Tuesday, Thursday, Saturday  Increase 10 repetitions per week  1  Straight leg raises (SLR)  2  VMO Modification SLR - (Rotate hip out externally) Do if SLR becomes easy    Exercise - 5 minutes per day Monday, Wednesday, Friday OR Tuesday, Thursday, Saturday  Increase 5 minutes per day per week  May use bike (non-impact) or walk (impact) or swim or alternate  Goal is to get up to at least 30 minutes per day  1  Bicycle  2  Walking    Weight loss   Goal to lose 1 pound per week  Portion control, limit sweets, limit carbs    Medications  Anti-inflammatories (NSAIDs)  1  Ibuprofen (Motrin or Advil) 600 mg (3 pills) with food 3 times a day for 3 - 7 days  OR  2  Naprosyn (Aleve) 1 - 2 pills twice a day with food for 3 - 7 days  Stop if you develop upset stomach or bleeding occurs  We discussed that scheduled NSAIDs for greater than 1 week should be discussed with PCP to monitor for potential side effects  Pain reliever  1  Acetaminophen (Tylenol) 2 pills (regular or extra-strength) 3 times a day for 3 - 7 days    Taken together (Acetaminophen with one of the NSAIDs (ibuprofen OR naprosyn)), the combination may work better than either one alone for more acute pain    Other  Braces, wraps, ice, heat, topical ointments may all be used/tried if they help pain/symptoms      CORTICOSTEROID INJECTION  What is a corticosteroid? Injuries or disease such as arthritis, bursitis or tendonitis result in inflammation  In turn, this inflammation can cause swelling and pain  A local injection of a corticosteroid is provided to diminish inflammation  By doing so, it will also decrease pain and swelling which is making you uncomfortable  Is this the same thing as a Cortisone Injection?   Cortisone® is a brand name of a corticosteroid used commonly in the past   Today I commonly use a more water-soluble corticosteroid named DepoMedrol    Will the injection hurt? As with any injection, you may feel pain at the time of the injection  Typically, I use a local anesthetic ( [place]) in addition to the corticosteroid to determine if the injection has been placed in the appropriate location  Hence it is important to monitor your symptoms 4-6 hours after the injection, as the area will be anesthetized (numb) while the local anesthetic is working  Once the local anesthetic wears off, the intensity of pain can be the same as it was prior to the injection, or even worse  This does not mean that the injection is not working  The corticosteroid may take 24-72 hours to begin having a positive effect  If you do experience an increase in pain, the use of an ice pack on the area for 20 minutes at a time should help  It is also helpful to take an oral anti-inflammatory such as Tylenol® or Motrin® if you are able to medically do so  For this reason it is best to avoid activities that put stress on the area the first 24 hours after the injection  How long will pain relief last?  This will vary according to the type and severity of the symptoms being treated and the severity of the condition  Symptom relief may last weeks to months  I typically couple injections with physical therapy so that the underlying problem causing the inflammation may be treated as the pain diminishes  If the combination is not successful, you may be a surgical candidate  I have read bad things about steroids  Will these things happen to me? Corticosteroids, when utilized properly, are safe and effective drugs  When used in a low dose, potential adverse reactions are very rare  Some patients may experience a sensation of flushing for several days  Very rarely, there can be a local reaction which may include increased discomfort for a period of time in the areas that has been injected    A steroid should not be used over and over again  Multiple injections in the same area can produce adverse effects such as tissue atrophy and degeneration of tendon or cartilage  A small percentage of patients (< 0 1%) may develop an infection in the joint after injection  This is a treatable problem, but if neglected, may result in permanent disability  Signs of infection include redness, swelling, discharge, fevers, increasing pain and drainage from the injection site  This represents an emergency and you should contact our office immediately or seek treatment in the ER if after hours  If I have diabetes, will this injection affect me? If you are diabetic, an injection of a corticosteroid can raise your blood sugar level, requiring more insulin for a brief period of time  This may necessitate careful blood sugar maintenance  If the elevated sugars are not able to be controlled, contact your diabetic doctor for guidance

## 2021-05-27 NOTE — PROGRESS NOTES
Orthopaedic Surgery Note    CC: Left Knee Pain      HPI:  Ms Bea Levy is a 80 y  o female with a history of left knee pain present for multiple years  Pain is worse with weightbearing activities and improves at rest  Pain has worsened, recently started using a cane to assist ambulation  No history of diabetes  No prior treatment to her knee  ALLERGIES:  Allergies   Allergen Reactions    Mushroom Extract Complex - Food Allergy Other (See Comments)     syncope       CURRENT MEDICATIONS:  Current Outpatient Medications   Medication Sig Dispense Refill    aspirin 81 MG tablet Take 1 tablet by mouth daily      B COMPLEX VITAMINS PO Take 1 tablet by mouth      calcium citrate-vitamin D (CITRACAL+D) 315-200 MG-UNIT per tablet Take 1 tablet by mouth      hyoscyamine (LEVSIN/SL) 0 125 mg SL tablet Take 1 tablet (0 125 mg total) by mouth every 4 (four) hours as needed for cramping 20 tablet 0    ketorolac (ACULAR) 0 5 % ophthalmic solution       lisinopril (ZESTRIL) 10 mg tablet Take 1 tablet (10 mg total) by mouth daily 90 tablet 1    Methylsulfonylmethane 1000 MG CAPS Take 1 tablet by mouth      Multiple Vitamins-Minerals (MULTIVITAMIN ADULT PO) Take 1 tablet by mouth      Multiple Vitamins-Minerals (PRESERVISION AREDS PO) Take by mouth 2 (two) times a day      simvastatin (ZOCOR) 10 mg tablet Take 1 tablet (10 mg total) by mouth daily 90 tablet 1     No current facility-administered medications for this visit          PAST MEDICAL HISTORY  Past Medical History:   Diagnosis Date    [address] cancer [address] [address] )     Right sided       SURGICAL HISTORY  Past Surgical History:   Procedure Laterality Date    BREAST SURGERY      EYE SURGERY  11/19/2019    Left cataract        FAMILY HISTORY  Family History   Problem Relation Age of Onset    No Known Problems Family     Coronary artery disease Father        SOCIAL HISTORY  Social History     Socioeconomic History    Marital status: /Civil Union     Spouse name: Not on file    Number of children: Not on file    Years of education: Not on file    Highest education level: Not on file   Occupational History    Not on file   Social Needs    Financial resource strain: Not on file    Food insecurity     Worry: Not on file     Inability: Not on file    Transportation needs     Medical: Not on file     Non-medical: Not on file   Tobacco Use    Smoking status: Never Smoker    Smokeless tobacco: Never Used    Tobacco comment: Nonsmoker - As per [employer]    Substance and Sexual Activity    Alcohol use: Yes     Comment: Red wine daily    Drug use: Never    Sexual activity: Not on file   Lifestyle    Physical activity     Days per week: Not on file     Minutes per session: Not on file    Stress: Not on file   Relationships    Social connections     Talks on phone: Not on file     Gets together: Not on file     Attends Spiritism service: Not on file     Active member of club or organization: Not on file     Attends meetings of clubs or organizations: Not on file     Relationship status: Not on file    Intimate partner violence     Fear of current or ex partner: Not on file     Emotionally abused: Not on file     Physically abused: Not on file     Forced sexual activity: Not on file   Other Topics Concern    Not on file   Social History Narrative    Not on file         Review of Systems   Metal Allergy: no  History of MRSA: no  History of DVT or PE: no  Active dental issues: no  Anesthesia complications: no    Patient indicated positive for amb dysfunction  Otherwise negative except per above and HPI  Physical Exam    Vitals  Vitals:    05/27/21 1512   BP: 120/76       BMI  Body mass index is 30 kg/m²  GENERAL: No acute distress  Alert and oriented  Well nourished and well hydrated  Appears stated age  HEENT : Normocephalic, atraumatic  Extraocular movements intact  Mask in place  NECK: Supple, trachea midline    LUNGS: Adequate and symmetric respiratory effort  No intercostal retractions or accessory muscle use  HEART: Extremities warm and perfused  ABDOMEN: Nondistended  SKIN: Warm and dry, no rash  Left  Knee   Inspection/Appearance:       Swelling: Yes      Patella is midline  Alignment:  Knee is in mild varus  Palpation - Soft Tissue: normal without effusion  ROM:       Extension - 0          Flexion - 100      extensor lag: no    Stability:  demonstrates no varus, valgus, anterior drawer or posterior drawer  Patella: stable, tracks normally  Sensation Intact to Light Touch in Sural, Saphenous, Tibial, Superficial Peroneal, and Deep Peroneal Nerve Distribution  Motor function 5 out of 5 strength in Tibialis Anterior, Gastrocnemius, Soleus, Extensor Hallucis Longus, and Flexor Hallucis Longus Muscles  Extremity Warm and Well Perfused  Brisk Capillary Refill in Toes  Imaging  A) Imaging modality available  Radiographs: yes  MRI scan: no  CT scan: no    B) Imaging findings  Subchondral cysts: no  Subchondral sclerosis: yes  Periarticular osteophytes: yes  Joint subluxation: no  Joint space narrowing: yes  Bone-on-bone articulations: yes  Avascular necrosis: no      Assessment and Plan  Left Knee Arthritis      Knee Pain / Arthritis Treatment Recommendations    Strengthening Exercises  10 repetitions each leg Monday, Wednesday, Friday OR Tuesday, Thursday, Saturday  Increase 10 repetitions per week  1  Straight leg raises (SLR)  2  VMO Modification SLR - (Rotate hip out externally) Do if SLR becomes easy    Exercise - 5 minutes per day Monday, Wednesday, Friday OR Tuesday, Thursday, Saturday  Increase 5 minutes per day per week  May use bike (non-impact) or walk (impact) or swim or alternate  Goal is to get up to at least 30 minutes per day  1  Bicycle  2  Walking    Weight loss   Goal to lose 1 pound per week  Portion control, limit sweets, limit carbs    Medications  Anti-inflammatories (NSAIDs)  1   Ibuprofen (Motrin or Advil) 600 mg (3 pills) with food 3 times a day for 3 - 7 days  OR  2  Naprosyn (Aleve) 1 - 2 pills twice a day with food for 3 - 7 days  Stop if you develop upset stomach or bleeding occurs  We discussed that scheduled NSAIDs for greater than 1 week should be discussed with PCP to monitor for potential side effects  Pain reliever  1  Acetaminophen (Tylenol) 2 pills (regular or extra-strength) 3 times a day for 3 - 7 days    Taken together (Acetaminophen with one of the NSAIDs (ibuprofen OR naprosyn)), the combination may work better than either one alone for more acute pain    Other  Braces, wraps, ice, heat, topical ointments may all be used/tried if they help pain/symptoms    Large joint arthrocentesis: L knee  Universal Protocol:  Consent: Verbal consent obtained  Risks and benefits: risks, benefits and alternatives were discussed  Consent given by: patient  Time out: Immediately prior to procedure a "time out" was called to verify the correct patient, procedure, equipment, support staff and site/side marked as required  Timeout called at: 5/27/2021 3:28 PM   Supporting Documentation  Indications: pain   Procedure Details  Location: knee - L knee  Needle size: 20 G  Ultrasound guidance: no  Approach: anterolateral  Medications administered: 4 mL bupivacaine 0 25 %; 1 mL methylPREDNISolone acetate 40 mg/mL    Patient tolerance: patient tolerated the procedure well with no immediate complications  Dressing:  Sterile dressing applied          PT script provided  Continue OTC meds as needed  Steroid injection performed  Follow up in 3 months for reevaluation    Nickolas Sandoval MD  Adult Reconstruction Surgery  Department [place] [id number]  3:46 PM

## 2021-06-01 RX ORDER — METHYLPREDNISOLONE ACETATE 40 MG/ML
1 INJECTION, SUSPENSION INTRA-ARTICULAR; INTRALESIONAL; INTRAMUSCULAR; SOFT TISSUE
Status: COMPLETED | OUTPATIENT
Start: 2021-05-27 | End: 2021-05-27

## 2021-06-01 RX ORDER — BUPIVACAINE HYDROCHLORIDE 2.5 MG/ML
4 INJECTION, SOLUTION INFILTRATION; PERINEURAL
Status: COMPLETED | OUTPATIENT
Start: 2021-05-27 | End: 2021-05-27

## 2021-06-02 ENCOUNTER — TELEPHONE (OUTPATIENT)
Dept: OBGYN CLINIC | Facility: HOSPITAL | Age: 86
End: 2021-06-02

## 2021-06-02 NOTE — TELEPHONE ENCOUNTER
Patient is calling stating that the injection that she got on 5/27/21 is not working  Patient is stating that she feels that she needs to go for knee sx but wants to talk to Dr Sushant Bearden    Patient would like to know how long she has to wait after the injection to have sx      [phone]

## 2021-07-29 ENCOUNTER — OFFICE VISIT (OUTPATIENT)
Dept: FAMILY MEDICINE CLINIC | Facility: HOSPITAL | Age: 86
End: 2021-07-29
Payer: COMMERCIAL

## 2021-07-29 VITALS
WEIGHT: 160.6 LBS | SYSTOLIC BLOOD PRESSURE: 122 MMHG | TEMPERATURE: 98 F | HEART RATE: 78 BPM | HEIGHT: 62 IN | BODY MASS INDEX: 29.55 KG/M2 | DIASTOLIC BLOOD PRESSURE: 70 MMHG

## 2021-07-29 DIAGNOSIS — Z01.818 PRE-OP EXAMINATION: Primary | ICD-10-CM

## 2021-07-29 DIAGNOSIS — I10 ESSENTIAL HYPERTENSION: ICD-10-CM

## 2021-07-29 DIAGNOSIS — E78.5 DYSLIPIDEMIA: ICD-10-CM

## 2021-07-29 DIAGNOSIS — M17.12 LOCALIZED OSTEOARTHRITIS OF LEFT KNEE: ICD-10-CM

## 2021-07-29 PROCEDURE — 1036F TOBACCO NON-USER: CPT | Performed by: FAMILY MEDICINE

## 2021-07-29 PROCEDURE — 99214 OFFICE O/P EST MOD 30 MIN: CPT | Performed by: FAMILY MEDICINE

## 2021-07-29 PROCEDURE — 1160F RVW MEDS BY RX/DR IN RCRD: CPT | Performed by: FAMILY MEDICINE

## 2021-07-29 PROCEDURE — 3725F SCREEN DEPRESSION PERFORMED: CPT | Performed by: FAMILY MEDICINE

## 2021-07-29 NOTE — LETTER
July 29, 2021     Brayan Gutierrez MD  [address]  [place] [address]    Patient: Darline Shanks   YOB: 1935   Date of Visit: 7/29/2021       Dear Dr Omar Diaz:    Thank you for referring Camron Fine to me for evaluation  Below are my notes for this consultation  If you have questions, please do not hesitate to call me  I look forward to following your patient along with you  Sincerely,        Serena Hayes MD        CC: No Recipients  Serena Hayes MD  7/29/2021  9:55 AM  Incomplete      Assessment/Plan:      Problem List Items Addressed This Visit        Cardiovascular and Mediastinum    Essential hypertension       Other    Dyslipidemia      Other Visit Diagnoses     Pre-op examination    -  Primary    Localized osteoarthritis of left knee               Plan/Discussion:  Patient is doing well  She has no active cardiac disease  Functionally independent with ADLS, can do > 4 mets ( limited only by knee pain)  ekg , cxr, labs reviewed  Advised to proceed with surgery as planned  Subjective:   Chief Complaint   Patient presents with   Moy Diaz L knee replacement 08/04/21        Patient ID: Darline Shanks is a 80 y o  female  Pt seen for preop  Ekg reviewed  cxr reviewed  Pre admission labs reviewed  She is feeling well  Plan for tkr , left on 8/4, by Dr Omar Diaz  Plan for [facility] for rehabilitation post op  No new concerns  No hx of CAD  Did have tia many years ago  Adls: independent  Can walk up a flight of stairs  Can walk a few blocks but limited by knee pain  Surgical hx reviewed: no problems she recalls  No issues with anesthesia           The following portions of the patient's history were reviewed and updated as appropriate: allergies, current medications, past family history, past medical history, past social history, past surgical history and problem list     Review of Systems   Constitutional: Negative  Negative for activity change, appetite change, chills, diaphoresis, fatigue and fever  HENT: Negative for congestion, facial swelling and sore throat  Respiratory: Negative  Negative for apnea, cough, chest tightness and shortness of breath  Cardiovascular: Negative  Negative for chest pain and palpitations  Gastrointestinal: Negative  Negative for abdominal distention, abdominal pain, blood in stool, constipation, diarrhea and nausea  Genitourinary: Negative  Negative for difficulty urinating, dysuria, flank pain and frequency  Objective:  Vitals:    07/29/21 0929   BP: 122/70   Pulse: 78   Temp: 98 °F (36 7 °C)   Weight: 72 8 kg (160 lb 9 6 oz)   Height: 5' 2" (1 575 m)     BP Readings from Last 6 Encounters:   07/29/21 122/70   05/27/21 120/76   07/28/20 126/72   03/11/20 142/84   01/31/20 120/72   11/06/19 140/78      Wt Readings from Last 6 Encounters:   07/29/21 72 8 kg (160 lb 9 6 oz)   05/27/21 74 4 kg (164 lb)   07/28/20 74 4 kg (164 lb)   03/11/20 73 5 kg (162 lb)   01/31/20 73 kg (161 lb)   11/06/19 72 1 kg (159 lb)             Physical Exam  Vitals and nursing note reviewed  Constitutional:       Appearance: Normal appearance  She is well-developed  She is not ill-appearing  HENT:      Head: Normocephalic and atraumatic  Right Ear: External ear normal       Left Ear: External ear normal       Nose: Nose normal       Mouth/Throat:      Mouth: Mucous membranes are moist    Eyes:      Conjunctiva/sclera: Conjunctivae normal       Pupils: Pupils are equal, round, and reactive to light  Neck:      Thyroid: No thyromegaly  Trachea: No tracheal deviation  Cardiovascular:      Rate and Rhythm: Normal rate and regular rhythm  Heart sounds: Normal heart sounds  No murmur heard  Pulmonary:      Effort: Pulmonary effort is normal  No respiratory distress  Breath sounds: Normal breath sounds  No wheezing     Abdominal: General: Bowel sounds are normal       Palpations: Abdomen is soft  Musculoskeletal:         General: Normal range of motion  Cervical back: Normal range of motion and neck supple  Skin:     General: Skin is warm and dry  Capillary Refill: Capillary refill takes less than 2 seconds  Neurological:      General: No focal deficit present  Mental Status: She is alert and oriented to person, place, and time  Psychiatric:         Mood and Affect: Mood normal          Behavior: Behavior normal          Thought Content:  Thought content normal          Judgment: Judgment normal

## 2021-07-29 NOTE — LETTER
July 29, 2021     Marzena Riggins MD  [address] [place] [address]    Patient: Dyan Morris   YOB: 1935   Date of Visit: 7/29/2021       Dear Dr Edyta Soler:    Thank you for referring Valentin Craft to me for evaluation  Below are my notes for this consultation  If you have questions, please do not hesitate to call me  I look forward to following your patient along with you  Sincerely,        Matthew Diehl MD        CC: No Recipients  Matthew Diehl MD  7/29/2021  9:54 AM  Incomplete      Assessment/Plan:      Problem List Items Addressed This Visit        Cardiovascular and Mediastinum    Essential hypertension - Primary       Other    Dyslipidemia      Other Visit Diagnoses     Pre-op examination        Localized osteoarthritis of left knee               Plan/Discussion:  Patient is doing well  She has no active cardiac disease  Functionally independent with ADLS, can do > 4 mets ( limited only by knee pain)  ekg , cxr, labs reviewed  Advised to proceed with surgery as planned  Subjective:   Chief Complaint   Patient presents with   Nawaf HULL knee replacement 08/04/21        Patient ID: Dyan Morris is a 80 y o  female  Pt seen for preop  Ekg reviewed  cxr reviewed  Pre admission labs reviewed  She is feeling well  Plan for tkr , left on 8/4, by Dr Edyta Soler  Plan for [facility] for rehabilitation post op  No new concerns  No hx of CAD  Did have tia many years ago  Adls: independent  Can walk up a flight of stairs  Can walk a few blocks but limited by knee pain  Surgical hx reviewed: no problems she recalls  No issues with anesthesia           The following portions of the patient's history were reviewed and updated as appropriate: allergies, current medications, past family history, past medical history, past social history, past surgical history and problem list     Review of Systems   Constitutional: Negative  Negative for activity change, appetite change, chills, diaphoresis, fatigue and fever  HENT: Negative for congestion, facial swelling and sore throat  Respiratory: Negative  Negative for apnea, cough, chest tightness and shortness of breath  Cardiovascular: Negative  Negative for chest pain and palpitations  Gastrointestinal: Negative  Negative for abdominal distention, abdominal pain, blood in stool, constipation, diarrhea and nausea  Genitourinary: Negative  Negative for difficulty urinating, dysuria, flank pain and frequency  Objective:  Vitals:    07/29/21 0929   BP: 122/70   Pulse: 78   Temp: 98 °F (36 7 °C)   Weight: 72 8 kg (160 lb 9 6 oz)   Height: 5' 2" (1 575 m)     BP Readings from Last 6 Encounters:   07/29/21 122/70   05/27/21 120/76   07/28/20 126/72   03/11/20 142/84   01/31/20 120/72   11/06/19 140/78      Wt Readings from Last 6 Encounters:   07/29/21 72 8 kg (160 lb 9 6 oz)   05/27/21 74 4 kg (164 lb)   07/28/20 74 4 kg (164 lb)   03/11/20 73 5 kg (162 lb)   01/31/20 73 kg (161 lb)   11/06/19 72 1 kg (159 lb)             Physical Exam  Vitals and nursing note reviewed  Constitutional:       Appearance: Normal appearance  She is well-developed  She is not ill-appearing  HENT:      Head: Normocephalic and atraumatic  Right Ear: External ear normal       Left Ear: External ear normal       Nose: Nose normal       Mouth/Throat:      Mouth: Mucous membranes are moist    Eyes:      Conjunctiva/sclera: Conjunctivae normal       Pupils: Pupils are equal, round, and reactive to light  Neck:      Thyroid: No thyromegaly  Trachea: No tracheal deviation  Cardiovascular:      Rate and Rhythm: Normal rate and regular rhythm  Heart sounds: Normal heart sounds  No murmur heard  Pulmonary:      Effort: Pulmonary effort is normal  No respiratory distress  Breath sounds: Normal breath sounds  No wheezing     Abdominal: General: Bowel sounds are normal       Palpations: Abdomen is soft  Musculoskeletal:         General: Normal range of motion  Cervical back: Normal range of motion and neck supple  Skin:     General: Skin is warm and dry  Capillary Refill: Capillary refill takes less than 2 seconds  Neurological:      General: No focal deficit present  Mental Status: She is alert and oriented to person, place, and time  Psychiatric:         Mood and Affect: Mood normal          Behavior: Behavior normal          Thought Content:  Thought content normal          Judgment: Judgment normal

## 2021-07-29 NOTE — PROGRESS NOTES
Assessment/Plan:      Problem List Items Addressed This Visit        Cardiovascular and Mediastinum    Essential hypertension       Other    Dyslipidemia      Other Visit Diagnoses     Pre-op examination    -  Primary    Localized osteoarthritis of left knee               Plan/Discussion:  Patient is doing well  She has no active cardiac disease  Functionally independent with ADLS, can do > 4 mets ( limited only by knee pain)  ekg , cxr, labs reviewed  Advised to proceed with surgery as planned  Subjective:   Chief Complaint   Patient presents with   Sandra HULL knee replacement 08/04/21        Patient ID: Scott Rojas is a 80 y o  female  Pt seen for preop  Ekg reviewed  cxr reviewed  Pre admission labs reviewed  She is feeling well  Plan for tkr , left on 8/4, by Dr Chon Lowery  Plan for [facility] for rehabilitation post op  No new concerns  No hx of CAD  Did have tia many years ago  Adls: independent  Can walk up a flight of stairs  Can walk a few blocks but limited by knee pain  Surgical hx reviewed: no problems she recalls  No issues with anesthesia  The following portions of the patient's history were reviewed and updated as appropriate: allergies, current medications, past family history, past medical history, past social history, past surgical history and problem list     Review of Systems   Constitutional: Negative  Negative for activity change, appetite change, chills, diaphoresis, fatigue and fever  HENT: Negative for congestion, facial swelling and sore throat  Respiratory: Negative  Negative for apnea, cough, chest tightness and shortness of breath  Cardiovascular: Negative  Negative for chest pain and palpitations  Gastrointestinal: Negative  Negative for abdominal distention, abdominal pain, blood in stool, constipation, diarrhea and nausea  Genitourinary: Negative    Negative for difficulty urinating, dysuria, flank pain and frequency  Objective:  Vitals:    07/29/21 0929   BP: 122/70   Pulse: 78   Temp: 98 °F (36 7 °C)   Weight: 72 8 kg (160 lb 9 6 oz)   Height: 5' 2" (1 575 m)     BP Readings from Last 6 Encounters:   07/29/21 122/70   05/27/21 120/76   07/28/20 126/72   03/11/20 142/84   01/31/20 120/72   11/06/19 140/78      Wt Readings from Last 6 Encounters:   07/29/21 72 8 kg (160 lb 9 6 oz)   05/27/21 74 4 kg (164 lb)   07/28/20 74 4 kg (164 lb)   03/11/20 73 5 kg (162 lb)   01/31/20 73 kg (161 lb)   11/06/19 72 1 kg (159 lb)             Physical Exam  Vitals and nursing note reviewed  Constitutional:       Appearance: Normal appearance  She is well-developed  She is not ill-appearing  HENT:      Head: Normocephalic and atraumatic  Right Ear: External ear normal       Left Ear: External ear normal       Nose: Nose normal       Mouth/Throat:      Mouth: Mucous membranes are moist    Eyes:      Conjunctiva/sclera: Conjunctivae normal       Pupils: Pupils are equal, round, and reactive to light  Neck:      Thyroid: No thyromegaly  Trachea: No tracheal deviation  Cardiovascular:      Rate and Rhythm: Normal rate and regular rhythm  Heart sounds: Normal heart sounds  No murmur heard  Pulmonary:      Effort: Pulmonary effort is normal  No respiratory distress  Breath sounds: Normal breath sounds  No wheezing  Abdominal:      General: Bowel sounds are normal       Palpations: Abdomen is soft  Musculoskeletal:         General: Normal range of motion  Cervical back: Normal range of motion and neck supple  Skin:     General: Skin is warm and dry  Capillary Refill: Capillary refill takes less than 2 seconds  Neurological:      General: No focal deficit present  Mental Status: She is alert and oriented to person, place, and time     Psychiatric:         Mood and Affect: Mood normal          Behavior: Behavior normal          Thought Content:  Thought content normal          Judgment: Judgment normal

## 2021-09-24 DIAGNOSIS — E78.5 DYSLIPIDEMIA: ICD-10-CM

## 2021-09-24 DIAGNOSIS — I10 ESSENTIAL HYPERTENSION: ICD-10-CM

## 2021-09-26 RX ORDER — SIMVASTATIN 10 MG
TABLET ORAL
Qty: 90 TABLET | Refills: 1 | Status: SHIPPED | OUTPATIENT
Start: 2021-09-26

## 2021-09-26 RX ORDER — LISINOPRIL 10 MG/1
TABLET ORAL
Qty: 90 TABLET | Refills: 1 | Status: SHIPPED | OUTPATIENT
Start: 2021-09-26

## 2022-02-09 ENCOUNTER — VBI (OUTPATIENT)
Dept: ADMINISTRATIVE | Facility: OTHER | Age: 87
End: 2022-02-09

## 2022-05-31 ENCOUNTER — RA CDI HCC (OUTPATIENT)
Dept: OTHER | Facility: HOSPITAL | Age: 87
End: 2022-05-31

## 2022-05-31 NOTE — PROGRESS NOTES
[facility]  coding opportunities       Chart reviewed, no opportunity found:   [address]        Patients Insurance     Medicare Insurance: [facility]

## 2022-06-07 ENCOUNTER — OFFICE VISIT (OUTPATIENT)
Dept: FAMILY MEDICINE CLINIC | Facility: HOSPITAL | Age: 87
End: 2022-06-07
Payer: COMMERCIAL

## 2022-06-07 VITALS
SYSTOLIC BLOOD PRESSURE: 142 MMHG | TEMPERATURE: 99.8 F | OXYGEN SATURATION: 96 % | HEIGHT: 62 IN | WEIGHT: 160.2 LBS | BODY MASS INDEX: 29.48 KG/M2 | DIASTOLIC BLOOD PRESSURE: 78 MMHG | HEART RATE: 92 BPM

## 2022-06-07 DIAGNOSIS — R55 SYNCOPE, UNSPECIFIED SYNCOPE TYPE: Primary | ICD-10-CM

## 2022-06-07 DIAGNOSIS — I10 ESSENTIAL HYPERTENSION: ICD-10-CM

## 2022-06-07 PROCEDURE — 99214 OFFICE O/P EST MOD 30 MIN: CPT | Performed by: FAMILY MEDICINE

## 2022-06-07 PROCEDURE — 1160F RVW MEDS BY RX/DR IN RCRD: CPT | Performed by: FAMILY MEDICINE

## 2022-06-07 PROCEDURE — 1036F TOBACCO NON-USER: CPT | Performed by: FAMILY MEDICINE

## 2022-06-07 NOTE — PROGRESS NOTES
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 and wonders if it related to that  Able to resume her activity after an episode    No incontinence or tongue biting  No feeling of palpitations or skipping of beats      Afraid if it were to happen when driving    BP not orthostatic on exam in office today      The following portions of the patient's history were reviewed and updated as appropriate: allergies, current medications, past family history, past medical history, past social history, past surgical history and problem list     Review of Systems      Objective:      /78 (BP Location: Left arm, Patient Position: Standing, Cuff Size: Standard)   Pulse 92   Temp 99 8 °F (37 7 °C) (Tympanic)   Ht 5' 2" (1 575 m)   Wt 72 7 kg (160 lb 3 2 oz)   SpO2 96%   BMI 29 30 kg/m²          Physical Exam

## 2022-06-13 ENCOUNTER — HOSPITAL ENCOUNTER (OUTPATIENT)
Dept: NON INVASIVE DIAGNOSTICS | Facility: HOSPITAL | Age: 87
Discharge: HOME | End: 2022-06-13
Payer: COMMERCIAL

## 2022-06-13 DIAGNOSIS — R55 SYNCOPE, UNSPECIFIED SYNCOPE TYPE: ICD-10-CM

## 2022-06-13 PROCEDURE — 93225 XTRNL ECG REC<48 HRS REC: CPT

## 2022-06-13 PROCEDURE — 93226 XTRNL ECG REC<48 HR SCAN A/R: CPT

## 2022-06-17 PROCEDURE — 93227 XTRNL ECG REC<48 HR R&I: CPT | Performed by: INTERNAL MEDICINE

## 2023-01-30 ENCOUNTER — OFFICE VISIT (OUTPATIENT)
Dept: FAMILY MEDICINE CLINIC | Facility: HOSPITAL | Age: 88
End: 2023-01-30

## 2023-01-30 VITALS
BODY MASS INDEX: 28.37 KG/M2 | SYSTOLIC BLOOD PRESSURE: 130 MMHG | HEIGHT: 62 IN | HEART RATE: 67 BPM | DIASTOLIC BLOOD PRESSURE: 80 MMHG | WEIGHT: 154.2 LBS | TEMPERATURE: 98.2 F

## 2023-01-30 DIAGNOSIS — L98.8 SKIN LESION OF BREAST: Primary | ICD-10-CM

## 2023-01-30 NOTE — PROGRESS NOTES
Assessment/Plan:   Left breast skin lesion  Possible seborrheic keratosis but not completely consistent  Refer to derm  Given breast cancer history will check diagnostic mammo  Diagnoses and all orders for this visit:    Skin lesion of breast  -     Ambulatory Referral to Dermatology; Future  -     Mammo diagnostic left w cad; Future          Subjective:     Patient ID: Neptali Mckeon is a 80 y o  female  Has a scabbed area on left breast that has been there for a few months  Not getting bigger but seems more red  Not painful or itchy  She has h/o breast cancer with lumpectomy right breast  She denies any breast lump on left  No recent mammogram  No nipple discharge  Review of Systems   Skin: Positive for rash  The following portions of the patient's history were reviewed and updated as appropriate: allergies, current medications, past family history, past medical history, past social history, past surgical history and problem list     Objective:  Vitals:    01/30/23 1543   BP: 130/80   Pulse: 67   Temp: 98 2 °F (36 8 °C)      Physical Exam  Vitals reviewed  Constitutional:       Appearance: Normal appearance  Cardiovascular:      Rate and Rhythm: Normal rate and regular rhythm  Heart sounds: Normal heart sounds  Pulmonary:      Effort: Pulmonary effort is normal       Breath sounds: Normal breath sounds  Chest:   Breasts:     Breasts are asymmetrical       Left: Skin change present  No inverted nipple, mass, nipple discharge or tenderness  Comments: Outer quadrant of left breast with brown raised patch with cauliflower like appearance  Stuck on appearance  Skin:     General: Skin is warm and dry  Neurological:      Mental Status: She is alert and oriented to person, place, and time  Psychiatric:         Mood and Affect: Mood normal          Behavior: Behavior normal          Thought Content:  Thought content normal          Judgment: Judgment normal

## 2023-02-06 ENCOUNTER — CONSULT (OUTPATIENT)
Dept: DERMATOLOGY | Facility: CLINIC | Age: 88
End: 2023-02-06

## 2023-02-06 VITALS — WEIGHT: 155 LBS | HEIGHT: 62 IN | BODY MASS INDEX: 28.52 KG/M2

## 2023-02-06 DIAGNOSIS — L98.8 SKIN LESION OF BREAST: ICD-10-CM

## 2023-02-06 DIAGNOSIS — D48.5 NEOPLASM OF UNCERTAIN BEHAVIOR OF SKIN: Primary | ICD-10-CM

## 2023-02-06 NOTE — PROGRESS NOTES
[facility]     Patient Name: Zoya Kerr  Encounter Date: 02/06/2023     Have you been cared for by a [place] [address] Dermatologist in the last 3 years and, if so, which description applies to you? NO  I am considered a "new" patient and must complete all patient intake questions  I am FEMALE/of child-bearing potential      REVIEW OF SYSTEMS:  Have you recently had or currently have any of the following? · Recent fever or chills? No  · Any non-healing wound? No  · Are you pregnant or planning to become pregnant? No  · Are you currently or planning to be nursing or breast feeding? No   PAST MEDICAL HISTORY:  Have you personally ever had or currently have any of the following? If "YES," then please provide more detail  · Skin cancer (such as Melanoma, Basal Cell Carcinoma, Squamous Cell Carcinoma? No  · Tuberculosis, HIV/AIDS, Hepatitis B or C: No  · Systemic Immunosuppression such as Diabetes, Biologic or Immunotherapy, Chemotherapy, Organ Transplantation, Bone Marrow Transplantation YES, Chemo due to lumpectomy   · Radiation Treatment YES, Same as above    FAMILY HISTORY:  Any "first degree relatives" (parent, brother, sister, or child) with the following? • Skin Cancer, Pancreatic or Other Cancer? No   PATIENT EXPERIENCE:    • Do you want the Dermatologist to perform a COMPLETE skin exam today including a clinical examination under the "bra and underwear" areas? NO  • If necessary, do we have your permission to call and leave a detailed message on your Preferred Phone number that includes your specific medical information?   Yes      Allergies   Allergen Reactions   • Mushroom Extract Complex - Food Allergy Other (See Comments)     syncope      Current Outpatient Medications:   •  aspirin 81 MG tablet, Take 1 tablet by mouth daily, Disp: , Rfl:   •  lisinopril (ZESTRIL) 10 mg tablet, TAKE 1 TABLET EVERY DAY, Disp: 90 tablet, Rfl: 1  •  simvastatin (ZOCOR) 10 mg tablet, TAKE 1 TABLET EVERY DAY, Disp: 90 tablet, Rfl: 1          • Whom besides the patient is providing clinical information about today's encounter?   o NO ADDITIONAL HISTORIAN (patient alone provided history)    Physical Exam and Assessment/Plan by Diagnosis:    NEOPLASM OF UNCERTAIN BEHAVIOR OF SKIN    Physical Exam:  • (Anatomic Location); (Size and Morphological Description); (Differential Diagnosis):  o Left lateral breast; 1 8 cm x 1 2 cm brown waxy plaque; DDx seborrheic keratosis rule out SCC  • Pertinent Positives:  • Pertinent Negatives: Additional History of Present Condition:  Pt states the spot has been there for the past 2-3 months  Pt reports itching  Assessment and Plan:  • I have discussed with the patient that a sample of skin via a "skin biopsy” would be potentially helpful to further make a specific diagnosis under the microscope  • Based on a thorough discussion of this condition and the management approach to it (including a comprehensive discussion of the known risks, side effects and potential benefits of treatment), the patient (family) agrees to implement the following specific plan:    o Procedure:  Skin Biopsy  After a thorough discussion of treatment options and risk/benefits/alternatives (including but not limited to local pain, scarring, dyspigmentation, blistering, possible superinfection, and inability to confirm a diagnosis via histopathology), verbal and written consent were obtained and portion of the rash was biopsied for tissue sample  See below for consent that was obtained from patient and subsequent Procedure Note    PROCEDURE TANGENTIAL (SHAVE) BIOPSY NOTE:    • Performing Physician: Garcia Romero  • Anatomic Location; Clinical Description with size (cm); Pre-Op Diagnosis:   Left lateral breast; 1 8 cm x 1 2 cm brown waxy plaque; DDx seborrheic keratosis rule out SCC  • Post-op diagnosis: Same     • Local anesthesia: 1% xylocaine with epi      • Topical anesthesia: None    • Hemostasis: Aluminum chloride       After obtaining informed consent  at which time there was a discussion about the purpose of biopsy  and low risks of infection and bleeding  The area was prepped and draped in the usual fashion  Anesthesia was obtained with 1% lidocaine with epinephrine  A shave biopsy to an appropriate sampling depth was obtained by Shave (Dermablade or 15 blade) The resulting wound was covered with surgical ointment and bandaged appropriately  The patient tolerated the procedure well without complications and was without signs of functional compromise  Specimen has been sent for review by Dermatopathology  Standard post-procedure care has been explained and has been included in written form within the patient's copy of Informed Consent  INFORMED CONSENT DISCUSSION AND POST-OPERATIVE INSTRUCTIONS FOR PATIENT    I   RATIONALE FOR PROCEDURE  I understand that a skin biopsy allows the Dermatologist to test a lesion or rash under the microscope to obtain a diagnosis  It usually involves numbing the area with numbing medication and removing a small piece of skin; sometimes the area will be closed with sutures  In this specific procedure, sutures are not usually needed  If any sutures are placed, then they are usually need to be removed in 2 weeks or less  I understand that my Dermatologist recommends that a skin "shave" biopsy be performed today  A local anesthetic, similar to the kind that a dentist uses when filling a cavity, will be injected with a very small needle into the skin area to be sampled  The injected skin and tissue underneath "will go to sleep” and become numb so no pain should be felt afterwards  An instrument shaped like a tiny "razor blade" (shave biopsy instrument) will be used to cut a small piece of tissue and skin from the area so that a sample of tissue can be taken and examined more closely under the microscope    A slight amount of bleeding will occur, but it will be stopped with direct pressure and a pressure bandage and any other appropriate methods  I understands that a scar will form where the wound was created  Surgical ointment will be applied to help protect the wound  Sutures are not usually needed  II   RISKS AND POTENTIAL COMPLICATIONS   I understand the risks and potential complications of a skin biopsy include but are not limited to the following:  • Bleeding  • Infection  • Pain  • Scar/keloid  • Skin discoloration  • Incomplete Removal  • Recurrence  • Nerve Damage/Numbness/Loss of Function  • Allergic Reaction to Anesthesia  • Biopsies are diagnostic procedures and based on findings additional treatment or evaluation may be required  • Loss or destruction of specimen resulting in no additional findings    My Dermatologist has explained to me the nature of the condition, the nature of the procedure, and the benefits to be reasonably expected compared with alternative approaches  My Dermatologist has discussed the likelihood of major risks or complications of this procedure including the specific risks listed above, such as bleeding, infection, and scarring/keloid  I understand that a scar is expected after this procedure  I understand that my physician cannot predict if the scar will form a "keloid," which extends beyond the borders of the wound that is created  A keloid is a thick, painful, and bumpy scar  A keloid can be difficult to treat, as it does not always respond well to therapy, which includes injecting cortisone directly into the keloid every few weeks  While this usually reduces the pain and size of the scar, it does not eliminate it  I understand that photographs may be taken before and after the procedure  These will be maintained as part of the medical providers confidential records and may not be made available to me    I further authorize the medical provider to use the photographs for teaching purposes or to illustrate scientific papers, books, or lectures if in his/her judgment, medical research, education, or science may benefit from its use  I have had an opportunity to fully inquire about the risks and benefits of this procedure and its alternatives  I have been given ample time and opportunity to ask questions and to seek a second opinion if I wished to do so  I acknowledge that there have specifically been no guarantees as to the cosmetic results from the procedure  I am aware that with any procedure there is always the possibility of an unexpected complication  III  POST-PROCEDURAL CARE (WHAT YOU WILL NEED TO DO "AFTER THE BIOPSY" TO OPTIMIZE HEALING)    • Keep the area clean and dry  Try NOT to remove the bandage or get it wet for the first 24 hours  • Gently clean the area and apply surgical ointment (such as Vaseline petrolatum ointment, which is available "over the counter" and not a prescription) to the biopsy site for up to 2 weeks straight  This acts to protect the wound from the outside world  • Sutures are not usually placed in this procedure  If any sutures were placed, return for suture removal as instructed (generally 1 week for the face, 2 weeks for the body)  • Take Acetaminophen (Tylenol) for discomfort, if no contraindications  Ibuprofen or aspirin could make bleeding worse  • Call our office immediately for signs of infection: fever, chills, increased redness, warmth, tenderness, discomfort/pain, or pus or foul smell coming from the wound  WHAT TO DO IF THERE IS ANY BLEEDING? If a small amount of bleeding is noticed, place a clean cloth over the area and apply firm pressure for ten minutes  Check the wound after 10 minutes of direct pressure  If bleeding persists, try one more time for an additional 10 minutes of direct pressure on the area    If the bleeding becomes heavier or does not stop after the second attempt, or if you have any other questions about this procedure, then please call your [address] [place] [address]'s Dermatologist by calling [phone] (SKIN)  I hereby acknowledge that I have reviewed and verified the site with my Dermatologist and have requested and authorized my Dermatologist to proceed with the procedure  WAIST UP SKIN EXAM PERFORMED  MELANOCYTIC NEVI ("Moles")    Physical Exam:  • Anatomic Location Affected:   Mostly on sun-exposed areas of the trunk and extremities  • Morphological Description:  Scattered, 1-4mm round to ovoid, symmetric and evenly bordered, regularly pigmented macules/papules without outliers other than if noted elsewhere in today's note  • Pertinent Positives:  • Pertinent Negatives: Additional History of Present Condition:      Assessment and Plan:  Based on a thorough discussion of this condition and the management approach to it (including a comprehensive discussion of the known risks, side effects and potential benefits of treatment), the patient (family) agrees to implement the following specific plan:  • The patient was encouraged to use an SPF30+ broad spectrum sunscreen daily and re-apply every 2-3 outdoors while outside  The importance of sun protection, self-skin exams, and sun avoidance was emphasized  An annual full body skin exam is recommended, and the patient was encouraged to return to the office sooner for any new or changing lesions of concerns  • Benign, reassured  • Annual skin check     Melanocytic Nevi  Melanocytic nevi ("moles") are tan or brown, raised or flat areas of the skin which have an increased number of melanocytes  Melanocytes are the cells in our body which make pigment and account for skin color  Some moles are present at birth (I e , "congenital nevi"), while others come up later in life (i e , "acquired nevi")  The sun can stimulate the body to make more moles  Sunburns are not the only thing that triggers more moles  Chronic sun exposure can do it too       Clinically distinguishing a healthy mole from melanoma may be difficult, even for experienced dermatologists  The "ABCDE's" of moles have been suggested as a means of helping to alert a person to a suspicious mole and the possible increased risk of melanoma  The suggestions for raising alert are as follows:    Asymmetry: Healthy moles tend to be symmetric, while melanomas are often asymmetric  Asymmetry means if you draw a line through the mole, the two halves do not match in color, size, shape, or surface texture  Asymmetry can be a result of rapid enlargement of a mole, the development of a raised area on a previously flat lesion, scaling, ulceration, bleeding or scabbing within the mole  Any mole that starts to demonstrate "asymmetry" should be examined promptly by a board certified dermatologist      Border: Healthy moles tend to have discrete, even borders  The border of a melanoma often blends into the normal skin and does not sharply delineate the mole from normal skin  Any mole that starts to demonstrate "uneven borders" should be examined promptly by a board certified dermatologist      Color: Healthy moles tend to be one color throughout  Melanomas tend to be made up of different colors ranging from dark black, blue, white, or red  Any mole that demonstrates a color change should be examined promptly by a board certified dermatologist      Diameter: Healthy moles tend to be smaller than 0 6 cm in size; an exception are "congenital nevi" that can be larger  Melanomas tend to grow and can often be greater than 0 6 cm (1/4 of an inch, or the size of a pencil eraser)  This is only a guideline, and many normal moles may be larger than 0 6 cm without being unhealthy    Any mole that starts to change in size (small to bigger or bigger to smaller) should be examined promptly by a board certified dermatologist      Evolving: Healthy moles tend to "stay the same "  Melanomas may often show signs of change or evolution such as a change in size, shape, color, or elevation  Any mole that starts to itch, bleed, crust, burn, hurt, or ulcerate or demonstrate a change or evolution should be examined promptly by a board certified dermatologist         LENTIGO    Physical Exam:  • Anatomic Location Affected: face, shoulders, arms  • Morphological Description:  Light brown well demarcated macules on sun exposed skin with reassuring dermoscopy  • Pertinent Positives:  • Pertinent Negatives: Additional History of Present Condition:      Assessment and Plan:  Based on a thorough discussion of this condition and the management approach to it (including a comprehensive discussion of the known risks, side effects and potential benefits of treatment), the patient (family) agrees to implement the following specific plan:  • When outside we recommend using a wide brim hat, sunglasses, long sleeve and pants, sunscreen with SPF 66+ with reapplication every 2 hours, or SPF specific clothing       What is a lentigo? A lentigo is a pigmented flat or slightly raised lesion with a clearly defined edge  Unlike an ephelis (freckle), it does not fade in the winter months  There are several kinds of lentigo  The name lentigo originally referred to its appearance resembling a small lentil  The plural of lentigo is lentigines, although “lentigos” is also in common use  Who gets lentigines? Lentigines can affect males and females of all ages and races  Solar lentigines are especially prevalent in fair skinned adults  Lentigines associated with syndromes are present at birth or arise during childhood  What causes lentigines? Common forms of lentigo are due to exposure to ultraviolet radiation:  • Sun damage including sunburn   • Indoor tanning   • Phototherapy, especially photochemotherapy (PUVA)    Ionizing radiation, eg radiation therapy, can also cause lentigines    Several familial syndromes associated with widespread lentigines originate from mutations in Axel-MAP kinase, mTOR signaling and PTEN pathways  What is the treatment for lentigines? Most lentigines are left alone  Attempts to lighten them may not be successful  The following approaches are used:  • SPF 50+ broad-spectrum sunscreen   • Hydroquinone bleaching cream   • Alpha hydroxy acids   • Vitamin C   • Retinoids   • Azelaic acid   • Chemical peels  Individual lesions can be permanently removed using:  • Cryotherapy   • Intense pulsed light   • Pigment lasers    How can lentigines be prevented? Lentigines associated with exposure ultraviolet radiation can be prevented by very careful sun protection  Clothing is more successful at preventing new lentigines than are sunscreens  What is the outlook for lentigines? Lentigines usually persist  They may increase in number with age and sun exposure  Some in sun-protected sites may fade and disappear  HERNANDEZ ANGIOMAS    Physical Exam:  • Anatomic Location Affected:  trunk  • Morphological Description:  Scattered cherry red, variably sized papules  • Pertinent Positives:  • Pertinent Negatives: Additional History of Present Condition:      Assessment and Plan:  Based on a thorough discussion of this condition and the management approach to it (including a comprehensive discussion of the known risks, side effects and potential benefits of treatment), the patient (family) agrees to implement the following specific plan:  • Monitor for changes  • Benign, reassured  •     Assessment and Plan:    Cherry angioma, also known as [place] Lambing spots, are benign vascular skin lesions  A "cherry angioma" is a firm red, blue or purple papule, 0 1-1 cm in diameter  When thrombosed, they can appear black in colour until evaluated with a dermatoscope when the red or purple colour is more easily seen  Cherry angioma may develop on any part of the body but most often appear on the scalp, face, lips and trunk    An angioma is due to proliferating endothelial cells; these are the cells that line the inside of a blood vessel  Angiomas can arise in early life or later in life; the most common type of angioma is a cherry angioma  Cherry angiomas are very common in males and females of any age or race  They are more noticeable in white skin than in skin of colour  They markedly increase in number from about the age of 36  There may be a family history of similar lesions  Eruptive cherry angiomas have been rarely reported to be associated with internal malignancy  The cause of angiomas is unknown  Genetic analysis of cherry angiomas has shown that they frequently carry specific somatic missense mutations in the GNAQ and GNA11 (Q209H) genes, which are involved in other vascular and melanocytic proliferations  SEBORRHEIC KERATOSIS; NON-INFLAMED    Physical Exam:  • Anatomic Location Affected:  trunk  • Morphological Description:  Brown waxy variably sized "stuck-on" appearing papules with reassuring dermoscopy  • Pertinent Positives:  • Pertinent Negatives: Additional History of Present Condition:      Assessment and Plan:  Based on a thorough discussion of this condition and the management approach to it (including a comprehensive discussion of the known risks, side effects and potential benefits of treatment), the patient (family) agrees to implement the following specific plan:  • Monitor for changes  • Benign, reassured  •     Seborrheic Keratosis  A seborrheic keratosis is a harmless warty spot that appears during adult life as a common sign of skin aging  Seborrheic keratoses can arise on any area of skin, covered or uncovered, with the usual exception of the palms and soles  They do not arise from mucous membranes  Seborrheic keratoses can have highly variable appearance  Seborrheic keratoses are extremely common  It has been estimated that over 90% of adults over the age of 61 years have one or more of them   They occur in males and females of all races, typically beginning to erupt in the 35s or 45s  They are uncommon under the age of 21 years  The precise cause of seborrhoeic keratoses is not known  Seborrhoeic keratoses are considered degenerative in nature  As time goes by, seborrheic keratoses tend to become more numerous  Some people inherit a tendency to develop a very large number of them; some people may have hundreds of them  There is no easy way to remove multiple lesions on a single occasion  Unless a specific lesion is "inflamed" and is causing pain or stinging/burning or is bleeding, most insurance companies do not authorize treatment        Scribe Attestation    I,:  Jen Perera am acting as a scribe while in the presence of the attending physician :       I,:  Vish Bear MD personally performed the services described in this documentation    as scribed in my presence :

## 2023-02-06 NOTE — PATIENT INSTRUCTIONS
INFORMED CONSENT DISCUSSION AND POST-OPERATIVE INSTRUCTIONS FOR PATIENT    I   RATIONALE FOR PROCEDURE  I understand that a skin biopsy allows the Dermatologist to test a lesion or rash under the microscope to obtain a diagnosis  It usually involves numbing the area with numbing medication and removing a small piece of skin; sometimes the area will be closed with sutures  In this specific procedure, sutures are not usually needed  If any sutures are placed, then they are usually need to be removed in 2 weeks or less  I understand that my Dermatologist recommends that a skin "shave" biopsy be performed today  A local anesthetic, similar to the kind that a dentist uses when filling a cavity, will be injected with a very small needle into the skin area to be sampled  The injected skin and tissue underneath "will go to sleep” and become numb so no pain should be felt afterwards  An instrument shaped like a tiny "razor blade" (shave biopsy instrument) will be used to cut a small piece of tissue and skin from the area so that a sample of tissue can be taken and examined more closely under the microscope  A slight amount of bleeding will occur, but it will be stopped with direct pressure and a pressure bandage and any other appropriate methods  I understands that a scar will form where the wound was created  Surgical ointment will be applied to help protect the wound  Sutures are not usually needed      II   RISKS AND POTENTIAL COMPLICATIONS   I understand the risks and potential complications of a skin biopsy include but are not limited to the following:  Bleeding  Infection  Pain  Scar/keloid  Skin discoloration  Incomplete Removal  Recurrence  Nerve Damage/Numbness/Loss of Function  Allergic Reaction to Anesthesia  Biopsies are diagnostic procedures and based on findings additional treatment or evaluation may be required  Loss or destruction of specimen resulting in no additional findings    My Dermatologist has explained to me the nature of the condition, the nature of the procedure, and the benefits to be reasonably expected compared with alternative approaches  My Dermatologist has discussed the likelihood of major risks or complications of this procedure including the specific risks listed above, such as bleeding, infection, and scarring/keloid  I understand that a scar is expected after this procedure  I understand that my physician cannot predict if the scar will form a "keloid," which extends beyond the borders of the wound that is created  A keloid is a thick, painful, and bumpy scar  A keloid can be difficult to treat, as it does not always respond well to therapy, which includes injecting cortisone directly into the keloid every few weeks  While this usually reduces the pain and size of the scar, it does not eliminate it  I understand that photographs may be taken before and after the procedure  These will be maintained as part of the medical providers confidential records and may not be made available to me  I further authorize the medical provider to use the photographs for teaching purposes or to illustrate scientific papers, books, or lectures if in his/her judgment, medical research, education, or science may benefit from its use  I have had an opportunity to fully inquire about the risks and benefits of this procedure and its alternatives  I have been given ample time and opportunity to ask questions and to seek a second opinion if I wished to do so  I acknowledge that there have specifically been no guarantees as to the cosmetic results from the procedure  I am aware that with any procedure there is always the possibility of an unexpected complication  III  POST-PROCEDURAL CARE (WHAT YOU WILL NEED TO DO "AFTER THE BIOPSY" TO OPTIMIZE HEALING)    Keep the area clean and dry  Try NOT to remove the bandage or get it wet for the first 24 hours      Gently clean the area and apply surgical ointment (such as Vaseline petrolatum ointment, which is available "over the counter" and not a prescription) to the biopsy site for up to 2 weeks straight  This acts to protect the wound from the outside world  Sutures are not usually placed in this procedure  If any sutures were placed, return for suture removal as instructed (generally 1 week for the face, 2 weeks for the body)  Take Acetaminophen (Tylenol) for discomfort, if no contraindications  Ibuprofen or aspirin could make bleeding worse  Call our office immediately for signs of infection: fever, chills, increased redness, warmth, tenderness, discomfort/pain, or pus or foul smell coming from the wound  WHAT TO DO IF THERE IS ANY BLEEDING? If a small amount of bleeding is noticed, place a clean cloth over the area and apply firm pressure for ten minutes  Check the wound after 10 minutes of direct pressure  If bleeding persists, try one more time for an additional 10 minutes of direct pressure on the area  If the bleeding becomes heavier or does not stop after the second attempt, or if you have any other questions about this procedure, then please call your [facility]'s Dermatologist by calling [phone] (SKIN)  I hereby acknowledge that I have reviewed and verified the site with my Dermatologist and have requested and authorized my Dermatologist to proceed with the procedure

## 2023-02-10 NOTE — RESULT ENCOUNTER NOTE
Pathology reviewed showing benign verrucous keratosis  Will message patient with result  Component   Case Report  Surgical Pathology Report                         Case: [id number]                                   Authorizing Provider: Shabbir Waterman MD     Collected:           02/06/2023 0927              Ordering Location:     [facility]      Received:            02/06/2023 0927                                     [place]                                                                 Pathologist:           Shabbir Waterman MD                                                       Specimen:    Skin, Other, Specimen A; Left lateral breast                                             Final Diagnosis  A  Skin, Left lateral breast, Shave biopsy:  Benign verrucous keratosis; extending to biopsy margins       Electronically signed by Shabbir Waterman MD on 2/10/2023 at 12:32 PM  Additional Information   All reported additional testing was performed with appropriately reactive controls   These tests were developed and their performance characteristics determined by [facility] or appropriate performing facility, though some tests may be performed on tissues which have not been validated for performance characteristics (such as staining performed on alcohol exposed cell blocks and decalcified tissues)   Results should be interpreted with caution and in the context of the patients' clinical condition  These tests may not be cleared or approved by the [place]  Food and Drug Administration, though the FDA has determined that such clearance or approval is not necessary  These tests are used for clinical purposes and they should not be regarded as investigational or for research  This laboratory has been approved by CLIA 88, designated as a high-complexity laboratory and is qualified to perform these tests  Tracy Pena Description   A   The specimen is received in formalin, labeled with the patient's name and hospital number, and is designated " left lateral breast"  The specimen consists of a shave biopsy of tan hyperkeratotic skin measuring 1 8 x 1 4 x 0 1 cm  The apparent margin of resection is inked green  The specimen is serially sectioned and entirely submitted between sponges in 2 cassettes      Note: The estimated total formalin fixation time based upon information provided by the submitting clinician and the standard processing schedule is under 72 hours  RRavotti  Clinical Information   Specimen A; Left lateral breast; Skin;  Shave biopsy; 79 yo female; 1 8 cm x 1 2 cm brown waxy plaque; DDx seborrheic keratosis rule out SCC     [facility]

## 2023-03-02 DIAGNOSIS — E78.5 DYSLIPIDEMIA: ICD-10-CM

## 2023-03-02 DIAGNOSIS — I10 ESSENTIAL HYPERTENSION: ICD-10-CM

## 2023-03-02 RX ORDER — LISINOPRIL 10 MG/1
10 TABLET ORAL DAILY
Qty: 90 TABLET | Refills: 1 | Status: SHIPPED | OUTPATIENT
Start: 2023-03-02

## 2023-03-02 RX ORDER — SIMVASTATIN 10 MG
10 TABLET ORAL DAILY
Qty: 90 TABLET | Refills: 1 | Status: SHIPPED | OUTPATIENT
Start: 2023-03-02

## 2023-09-01 ENCOUNTER — OFFICE VISIT (OUTPATIENT)
Dept: FAMILY MEDICINE CLINIC | Facility: HOSPITAL | Age: 88
End: 2023-09-01
Payer: COMMERCIAL

## 2023-09-01 VITALS
WEIGHT: 147 LBS | SYSTOLIC BLOOD PRESSURE: 124 MMHG | BODY MASS INDEX: 27.05 KG/M2 | OXYGEN SATURATION: 96 % | HEIGHT: 62 IN | HEART RATE: 64 BPM | TEMPERATURE: 99 F | DIASTOLIC BLOOD PRESSURE: 76 MMHG | RESPIRATION RATE: 16 BRPM

## 2023-09-01 DIAGNOSIS — I10 ESSENTIAL HYPERTENSION: ICD-10-CM

## 2023-09-01 DIAGNOSIS — R55 SYNCOPE, UNSPECIFIED SYNCOPE TYPE: Primary | ICD-10-CM

## 2023-09-01 DIAGNOSIS — R42 DIZZINESS: ICD-10-CM

## 2023-09-01 LAB — SINUS: 63 CM

## 2023-09-01 PROCEDURE — 99214 OFFICE O/P EST MOD 30 MIN: CPT | Performed by: INTERNAL MEDICINE

## 2023-09-01 PROCEDURE — 93000 ELECTROCARDIOGRAM COMPLETE: CPT | Performed by: INTERNAL MEDICINE

## 2023-09-01 NOTE — ASSESSMENT & PLAN NOTE
Patient presents with a chief complaint of feeling like passing out episodes that have been occurring for a few months couple of times a month. The episodes last for couple of minutes then she stands up she feels back to normal    She has feelings of lightheadedness that are sudden onset and she feels like passing out during them. She feels that once she passed out. They may occur in various positions such as sitting or standing. She does not feel lightheaded when she stands up or sits up. She presents passing out by sitting down, holding onto something and putting her head down. She has had no chest pain, palpitations, shortness of breath, nausea, vomiting, sweating, headache, focal extremity weakness or numbness during these episodes. She had a Holter monitor in 2022 that showed sinus bradycardia and benign supraventricular arrhythmia. She has no orthostatic hypotension today, EKG shows sinus rhythm with heart of 63 bpm, normal intervals. Her lung, heart and neuro examinations are normal.    I am concerned that symptomatic bradycardia is the cause for her presyncopal episodes. She had no orthostatic hypotension today. I referred patient to cardiology for evaluation of longer heart rhythm monitoring to catch an episode.     Continue same dose of lisinopril

## 2023-09-01 NOTE — PROGRESS NOTES
Assessment/Plan:    Syncope  Patient presents with a chief complaint of feeling like passing out episodes that have been occurring for a few months couple of times a month. The episodes last for couple of minutes then she stands up she feels back to normal    She has feelings of lightheadedness that are sudden onset and she feels like passing out during them. She feels that once she passed out. They may occur in various positions such as sitting or standing. She does not feel lightheaded when she stands up or sits up. She presents passing out by sitting down, holding onto something and putting her head down. She has had no chest pain, palpitations, shortness of breath, nausea, vomiting, sweating, headache, focal extremity weakness or numbness during these episodes. She had a Holter monitor in 2022 that showed sinus bradycardia and benign supraventricular arrhythmia. She has no orthostatic hypotension today, EKG shows sinus rhythm with heart of 63 bpm, normal intervals. Her lung, heart and neuro examinations are normal.    I am concerned that symptomatic bradycardia is the cause for her presyncopal episodes. She had no orthostatic hypotension today. I referred patient to cardiology for evaluation of longer heart rhythm monitoring to catch an episode. Continue same dose of lisinopril    Essential hypertension  Blood pressure is stable. Continue same dose of lisinopril         Diagnoses and all orders for this visit:    Syncope, unspecified syncope type  -     Ambulatory Referral to Cardiology; Future    Dizziness  -     POCT ECG    Essential hypertension          Subjective:      Patient ID: Gomez Restrepo is a 80 y.o. female. HPI    Patient presents for follow-up of chronic conditions as detailed in the assessment and plan.       The following portions of the patient's history were reviewed and updated as appropriate: current medications, past family history, past medical history, past social history, past surgical history and problem list.    Review of Systems      Objective:    /76 (BP Location: Left arm, Patient Position: Standing)   Pulse 64   Temp 99 °F (37.2 °C) (Tympanic)   Resp 16   Ht 5' 2" (1.575 m)   Wt 66.7 kg (147 lb)   SpO2 96%   BMI 26.89 kg/m²      Physical Exam  Constitutional:       General: She is not in acute distress. Appearance: She is not toxic-appearing. HENT:      Mouth/Throat:      Mouth: Mucous membranes are moist.      Pharynx: No oropharyngeal exudate. Cardiovascular:      Rate and Rhythm: Normal rate and regular rhythm. Heart sounds: No murmur heard. Pulmonary:      Effort: No respiratory distress. Breath sounds: No wheezing or rales. Neurological:      Mental Status: She is alert and oriented to person, place, and time. Cranial Nerves: No cranial nerve deficit. Motor: No weakness.       Gait: Gait normal.   Psychiatric:         Mood and Affect: Mood normal.             Matt Randle MD

## 2023-09-08 ENCOUNTER — TELEPHONE (OUTPATIENT)
Dept: FAMILY MEDICINE CLINIC | Facility: HOSPITAL | Age: 88
End: 2023-09-08

## 2023-11-02 ENCOUNTER — CONSULT (OUTPATIENT)
Dept: CARDIOLOGY CLINIC | Facility: CLINIC | Age: 88
End: 2023-11-02
Payer: COMMERCIAL

## 2023-11-02 VITALS
WEIGHT: 149 LBS | HEIGHT: 62 IN | BODY MASS INDEX: 27.42 KG/M2 | HEART RATE: 67 BPM | SYSTOLIC BLOOD PRESSURE: 134 MMHG | OXYGEN SATURATION: 97 % | DIASTOLIC BLOOD PRESSURE: 70 MMHG

## 2023-11-02 DIAGNOSIS — I10 PRIMARY HYPERTENSION: Primary | ICD-10-CM

## 2023-11-02 DIAGNOSIS — E78.2 MIXED HYPERLIPIDEMIA: ICD-10-CM

## 2023-11-02 DIAGNOSIS — R55 SYNCOPE, UNSPECIFIED SYNCOPE TYPE: ICD-10-CM

## 2023-11-02 PROCEDURE — 99204 OFFICE O/P NEW MOD 45 MIN: CPT | Performed by: INTERNAL MEDICINE

## 2023-11-02 NOTE — PROGRESS NOTES
Consultation - Cardiology   Stephenie Salinas 80 y.o. female MRN: [id number]    Encounter: [id number]    7. Primary hypertension        2. Syncope, unspecified syncope type  Ambulatory Referral to Cardiology      3. Mixed hyperlipidemia            Assessment/Plan     Assessment:     Near Syncope  Hypertension  Hyperlipidemia    Plan:    Near Syncope: Her resting EKG is normal. Discussed increasing hydration. Check zio patch and echocardiogram.     Hypertension: BP is controlled on lisinopril. Hyperlipidemia: Continue simvastatin. Labs pending. History of Present Illness   Physician Requesting Consult: No att. providers found  Reason for Consult / Principal Problem: Near Syncope  HPI: Stephenie Salinas is a 80y.o. year old female who presents with episodes of near syncope. She has had three episodes of near syncope. She has had episodes of lightheadedness that typically occur while standing. She has no exertional dyspnea or chest pain. She has no prior history of cad, chf or arrhythmia. Holter from 2022 reviewed that showed no significant arrhythmia. Review of Systems   Constitutional: Negative. HENT: Negative. Eyes: Negative. Cardiovascular: Negative. Respiratory: Negative. Endocrine: Negative. Hematologic/Lymphatic: Negative. Skin: Negative. Musculoskeletal: Negative. Gastrointestinal: Negative. Genitourinary: Negative. Neurological: Negative. Psychiatric/Behavioral: Negative. Allergic/Immunologic: Negative.         Historical Information   Past Medical History:   Diagnosis Date    Breast cancer [facility])     Right sided     Past Surgical History:   Procedure Laterality Date    BREAST SURGERY      EYE SURGERY  11/19/2019    Left cataract     REPLACEMENT TOTAL KNEE Left 08/04/2021    left knee placment       Social History:  Social History     Substance and Sexual Activity   Alcohol Use Yes    Comment: Red wine daily     Social History     Substance and Sexual Activity Drug Use Never     Social History     Tobacco Use   Smoking Status Never   Smokeless Tobacco Never   Tobacco Comments    Nonsmoker - As per [employer]        Family History:   Family History   Problem Relation Age of Onset    No Known Problems Family     Coronary artery disease Father        Meds/Allergies   Allergies   Allergen Reactions    Mushroom Extract Complex - Food Allergy Other (See Comments)     syncope       Current Outpatient Medications:     aspirin 81 MG tablet, Take 1 tablet by mouth daily, Disp: , Rfl:     lisinopril (ZESTRIL) 10 mg tablet, Take 1 tablet (10 mg total) by mouth daily, Disp: 90 tablet, Rfl: 1    simvastatin (ZOCOR) 10 mg tablet, Take 1 tablet (10 mg total) by mouth daily, Disp: 90 tablet, Rfl: 1    Vitals:   Pulse: 67  Blood Pressue: 134/70  Weight: 67.6 kg (149 lb)      Physical Exam  Constitutional:       General: She is not in acute distress. Appearance: She is well-developed. She is not diaphoretic. HENT:      Head: Normocephalic. Eyes:      General: No scleral icterus. Right eye: No discharge. Conjunctiva/sclera: Conjunctivae normal.   Neck:      Vascular: No JVD. Cardiovascular:      Rate and Rhythm: Normal rate and regular rhythm. Heart sounds: No murmur heard. No friction rub. No gallop. Pulmonary:      Effort: Pulmonary effort is normal. No respiratory distress. Breath sounds: Normal breath sounds. No wheezing or rales. Abdominal:      General: Bowel sounds are normal. There is no distension. Palpations: Abdomen is soft. Tenderness: There is no abdominal tenderness. There is no rebound. Musculoskeletal:         General: No tenderness or deformity. Cervical back: Normal range of motion. Skin:     General: Skin is warm and dry. Neurological:      Mental Status: She is alert and oriented to person, place, and time.          [unfilled]    Invasive Devices       None                   Lab Results   Component Value Date  12/18/2014     12/18/2014    CO2 27 12/18/2014    ANIONGAP 8 12/18/2014    BUN 23 12/18/2014    CREATININE 0.78 12/18/2014    GLUCOSE 97 12/18/2014    CALCIUM 9.6 12/18/2014    AST 24 12/18/2014    ALT 36 12/18/2014    ALKPHOS 93 12/18/2014    PROT 6.7 12/18/2014    BILITOT 0.4 12/18/2014     Lab Results   Component Value Date    WBC 5.97 12/17/2014    HGB 13.5 12/17/2014     12/17/2014     No components found for: "TROP"    Imaging:     EKG: NSR Low voltage ( from PCP office)    Counseling / Coordination of Care  Total floor / unit time spent today 45 minutes. Greater than 50% of total time was spent with the patient and / or family counseling and / or coordination of care. A description of the counseling / coordination of care.

## 2023-11-06 ENCOUNTER — TELEPHONE (OUTPATIENT)
Dept: CARDIOLOGY CLINIC | Facility: CLINIC | Age: 88
End: 2023-11-06

## 2023-11-06 NOTE — TELEPHONE ENCOUNTER
AMB Extended Holter Monitor ([id number]) and ([id number]):    No Authorization Required per Ana Morales in [address] [place] [address] on 11/6/2023 at 1:44pm  Reference #[id number]

## 2024-01-09 ENCOUNTER — HOSPITAL ENCOUNTER (OUTPATIENT)
Dept: NON INVASIVE DIAGNOSTICS | Age: 89
Discharge: HOME | End: 2024-01-09
Payer: COMMERCIAL

## 2024-01-09 VITALS
BODY MASS INDEX: 27.42 KG/M2 | SYSTOLIC BLOOD PRESSURE: 134 MMHG | WEIGHT: 149 LBS | DIASTOLIC BLOOD PRESSURE: 70 MMHG | HEART RATE: 85 BPM | HEIGHT: 62 IN

## 2024-01-09 DIAGNOSIS — R55 SYNCOPE, UNSPECIFIED SYNCOPE TYPE: ICD-10-CM

## 2024-01-09 LAB
AORTIC ROOT: 3.2 CM
APICAL FOUR CHAMBER EJECTION FRACTION: 66 %
ASCENDING AORTA: 2.9 CM
BSA FOR ECHO PROCEDURE: 1.69 M2
E WAVE DECELERATION TIME: 236 MS
E/A RATIO: 0.65
FRACTIONAL SHORTENING: 32 (ref 28–44)
INTERVENTRICULAR SEPTUM IN DIASTOLE (PARASTERNAL SHORT AXIS VIEW): 1.1 CM
INTERVENTRICULAR SEPTUM: 1.1 CM (ref 0.6–1.1)
LAAS-AP2: 12.6 CM2
LAAS-AP4: 13.3 CM2
LEFT ATRIUM SIZE: 3.3 CM
LEFT ATRIUM VOLUME (MOD BIPLANE): 30 ML
LEFT ATRIUM VOLUME INDEX (MOD BIPLANE): 17.8 ML/M2
LEFT INTERNAL DIMENSION IN SYSTOLE: 2.5 CM (ref 2.1–4)
LEFT VENTRICLE DIASTOLIC VOLUME (MOD BIPLANE): 80 ML
LEFT VENTRICLE DIASTOLIC VOLUME INDEX (MOD BIPLANE): 47.3 ML/M2
LEFT VENTRICLE SYSTOLIC VOLUME (MOD BIPLANE): 32 ML
LEFT VENTRICLE SYSTOLIC VOLUME INDEX (MOD BIPLANE): 18.9 ML/M2
LEFT VENTRICULAR INTERNAL DIMENSION IN DIASTOLE: 3.7 CM (ref 3.5–6)
LEFT VENTRICULAR POSTERIOR WALL IN END DIASTOLE: 1.2 CM
LEFT VENTRICULAR STROKE VOLUME: 34 ML
LV EF: 60 %
LVSV (TEICH): 34 ML
MV E'TISSUE VEL-LAT: 10 CM/S
MV E'TISSUE VEL-SEP: 10 CM/S
MV PEAK A VEL: 0.95 M/S
MV PEAK E VEL: 62 CM/S
MV STENOSIS PRESSURE HALF TIME: 68 MS
MV VALVE AREA P 1/2 METHOD: 3.24
RA PRESSURE ESTIMATED: 3 MMHG
RIGHT ATRIUM AREA SYSTOLE A4C: 10.7 CM2
RIGHT VENTRICLE ID DIMENSION: 2.5 CM
SL CV LEFT ATRIUM LENGTH A2C: 4.4 CM
SL CV LV EF: 60
SL CV PED ECHO LEFT VENTRICLE DIASTOLIC VOLUME (MOD BIPLANE) 2D: 57 ML
SL CV PED ECHO LEFT VENTRICLE SYSTOLIC VOLUME (MOD BIPLANE) 2D: 23 ML
TRICUSPID ANNULAR PLANE SYSTOLIC EXCURSION: 1.8 CM

## 2024-01-09 PROCEDURE — 93306 TTE W/DOPPLER COMPLETE: CPT | Performed by: INTERNAL MEDICINE

## 2024-01-09 PROCEDURE — 93306 TTE W/DOPPLER COMPLETE: CPT

## 2024-02-21 PROBLEM — Z00.00 MEDICARE ANNUAL WELLNESS VISIT, SUBSEQUENT: Status: RESOLVED | Noted: 2019-11-07 | Resolved: 2024-02-21

## 2024-03-01 ENCOUNTER — HOSPITAL ENCOUNTER (INPATIENT)
Facility: HOSPITAL | Age: 89
LOS: 1 days | Discharge: HOME | DRG: 303 | End: 2024-03-03
Attending: EMERGENCY MEDICINE | Admitting: HOSPITALIST
Payer: COMMERCIAL

## 2024-03-01 ENCOUNTER — APPOINTMENT (EMERGENCY)
Dept: RADIOLOGY | Facility: HOSPITAL | Age: 89
DRG: 303 | End: 2024-03-01
Payer: COMMERCIAL

## 2024-03-01 ENCOUNTER — APPOINTMENT (EMERGENCY)
Dept: CT IMAGING | Facility: HOSPITAL | Age: 89
DRG: 303 | End: 2024-03-01
Payer: COMMERCIAL

## 2024-03-01 DIAGNOSIS — R00.1 SINUS BRADYCARDIA: ICD-10-CM

## 2024-03-01 DIAGNOSIS — R55 SYNCOPE: Primary | ICD-10-CM

## 2024-03-01 PROBLEM — I25.10 CAD (CORONARY ARTERY DISEASE): Status: ACTIVE | Noted: 2024-03-01

## 2024-03-01 LAB
2HR DELTA HS TROPONIN: 1 NG/L
4HR DELTA HS TROPONIN: 1 NG/L
ALBUMIN SERPL BCP-MCNC: 4.4 G/DL (ref 3.5–5)
ALP SERPL-CCNC: 54 U/L (ref 34–104)
ALT SERPL W P-5'-P-CCNC: 11 U/L (ref 7–52)
ANION GAP SERPL CALCULATED.3IONS-SCNC: 7 MMOL/L
APTT PPP: 25 SECONDS (ref 23–37)
AST SERPL W P-5'-P-CCNC: 17 U/L (ref 13–39)
ATRIAL RATE: 55 BPM
BACTERIA UR QL AUTO: ABNORMAL /HPF
BASOPHILS # BLD AUTO: 0.06 THOUSANDS/ÂΜL (ref 0–0.1)
BASOPHILS NFR BLD AUTO: 1 % (ref 0–1)
BILIRUB SERPL-MCNC: 0.59 MG/DL (ref 0.2–1)
BILIRUB UR QL STRIP: NEGATIVE
BNP SERPL-MCNC: 124 PG/ML (ref 0–100)
BUN SERPL-MCNC: 24 MG/DL (ref 5–25)
CALCIUM SERPL-MCNC: 10.8 MG/DL (ref 8.4–10.2)
CARDIAC TROPONIN I PNL SERPL HS: 10 NG/L
CARDIAC TROPONIN I PNL SERPL HS: 10 NG/L
CARDIAC TROPONIN I PNL SERPL HS: 9 NG/L
CHLORIDE SERPL-SCNC: 106 MMOL/L (ref 96–108)
CLARITY UR: CLEAR
CO2 SERPL-SCNC: 28 MMOL/L (ref 21–32)
COLOR UR: YELLOW
CREAT SERPL-MCNC: 1.07 MG/DL (ref 0.6–1.3)
EOSINOPHIL # BLD AUTO: 0.1 THOUSAND/ÂΜL (ref 0–0.61)
EOSINOPHIL NFR BLD AUTO: 2 % (ref 0–6)
ERYTHROCYTE [DISTWIDTH] IN BLOOD BY AUTOMATED COUNT: 12 % (ref 11.6–15.1)
GFR SERPL CREATININE-BSD FRML MDRD: 46 ML/MIN/1.73SQ M
GLUCOSE SERPL-MCNC: 148 MG/DL (ref 65–140)
GLUCOSE UR STRIP-MCNC: NEGATIVE MG/DL
HCT VFR BLD AUTO: 42.9 % (ref 34.8–46.1)
HGB BLD-MCNC: 13.6 G/DL (ref 11.5–15.4)
HGB UR QL STRIP.AUTO: NEGATIVE
HYALINE CASTS #/AREA URNS LPF: ABNORMAL /LPF
IMM GRANULOCYTES # BLD AUTO: 0.01 THOUSAND/UL (ref 0–0.2)
IMM GRANULOCYTES NFR BLD AUTO: 0 % (ref 0–2)
INR PPP: 0.94 (ref 0.84–1.19)
KETONES UR STRIP-MCNC: NEGATIVE MG/DL
LEUKOCYTE ESTERASE UR QL STRIP: NEGATIVE
LIPASE SERPL-CCNC: 50 U/L (ref 11–82)
LYMPHOCYTES # BLD AUTO: 2.24 THOUSANDS/ÂΜL (ref 0.6–4.47)
LYMPHOCYTES NFR BLD AUTO: 33 % (ref 14–44)
MCH RBC QN AUTO: 32.2 PG (ref 26.8–34.3)
MCHC RBC AUTO-ENTMCNC: 31.7 G/DL (ref 31.4–37.4)
MCV RBC AUTO: 101 FL (ref 82–98)
MONOCYTES # BLD AUTO: 0.48 THOUSAND/ÂΜL (ref 0.17–1.22)
MONOCYTES NFR BLD AUTO: 7 % (ref 4–12)
MUCOUS THREADS UR QL AUTO: ABNORMAL
NEUTROPHILS # BLD AUTO: 3.96 THOUSANDS/ÂΜL (ref 1.85–7.62)
NEUTS SEG NFR BLD AUTO: 57 % (ref 43–75)
NITRITE UR QL STRIP: NEGATIVE
NON-SQ EPI CELLS URNS QL MICRO: ABNORMAL /HPF
NRBC BLD AUTO-RTO: 0 /100 WBCS
P AXIS: 47 DEGREES
PH UR STRIP.AUTO: 6 [PH]
PLATELET # BLD AUTO: 200 THOUSANDS/UL (ref 149–390)
PMV BLD AUTO: 10.6 FL (ref 8.9–12.7)
POTASSIUM SERPL-SCNC: 3.8 MMOL/L (ref 3.5–5.3)
PR INTERVAL: 136 MS
PROT SERPL-MCNC: 7.1 G/DL (ref 6.4–8.4)
PROT UR STRIP-MCNC: ABNORMAL MG/DL
PROTHROMBIN TIME: 13 SECONDS (ref 11.6–14.5)
QRS AXIS: 0 DEGREES
QRSD INTERVAL: 74 MS
QT INTERVAL: 424 MS
QTC INTERVAL: 405 MS
RBC # BLD AUTO: 4.23 MILLION/UL (ref 3.81–5.12)
RBC #/AREA URNS AUTO: ABNORMAL /HPF
SODIUM SERPL-SCNC: 141 MMOL/L (ref 135–147)
SP GR UR STRIP.AUTO: 1.02 (ref 1–1.03)
T WAVE AXIS: 30 DEGREES
TSH SERPL DL<=0.05 MIU/L-ACNC: 1.55 UIU/ML (ref 0.45–4.5)
UROBILINOGEN UR STRIP-ACNC: <2 MG/DL
VENTRICULAR RATE: 55 BPM
WBC # BLD AUTO: 6.85 THOUSAND/UL (ref 4.31–10.16)
WBC #/AREA URNS AUTO: ABNORMAL /HPF

## 2024-03-01 PROCEDURE — 80053 COMPREHEN METABOLIC PANEL: CPT | Performed by: EMERGENCY MEDICINE

## 2024-03-01 PROCEDURE — 99222 1ST HOSP IP/OBS MODERATE 55: CPT | Performed by: PHYSICIAN ASSISTANT

## 2024-03-01 PROCEDURE — 85730 THROMBOPLASTIN TIME PARTIAL: CPT | Performed by: PHYSICIAN ASSISTANT

## 2024-03-01 PROCEDURE — 85025 COMPLETE CBC W/AUTO DIFF WBC: CPT | Performed by: EMERGENCY MEDICINE

## 2024-03-01 PROCEDURE — 93010 ELECTROCARDIOGRAM REPORT: CPT | Performed by: INTERNAL MEDICINE

## 2024-03-01 PROCEDURE — 84443 ASSAY THYROID STIM HORMONE: CPT | Performed by: PHYSICIAN ASSISTANT

## 2024-03-01 PROCEDURE — 99285 EMERGENCY DEPT VISIT HI MDM: CPT

## 2024-03-01 PROCEDURE — 83880 ASSAY OF NATRIURETIC PEPTIDE: CPT | Performed by: PHYSICIAN ASSISTANT

## 2024-03-01 PROCEDURE — 83690 ASSAY OF LIPASE: CPT | Performed by: PHYSICIAN ASSISTANT

## 2024-03-01 PROCEDURE — 36415 COLL VENOUS BLD VENIPUNCTURE: CPT | Performed by: EMERGENCY MEDICINE

## 2024-03-01 PROCEDURE — 99285 EMERGENCY DEPT VISIT HI MDM: CPT | Performed by: PHYSICIAN ASSISTANT

## 2024-03-01 PROCEDURE — 71250 CT THORAX DX C-: CPT

## 2024-03-01 PROCEDURE — 85610 PROTHROMBIN TIME: CPT | Performed by: PHYSICIAN ASSISTANT

## 2024-03-01 PROCEDURE — 93005 ELECTROCARDIOGRAM TRACING: CPT

## 2024-03-01 PROCEDURE — 81001 URINALYSIS AUTO W/SCOPE: CPT | Performed by: PHYSICIAN ASSISTANT

## 2024-03-01 PROCEDURE — 84484 ASSAY OF TROPONIN QUANT: CPT | Performed by: EMERGENCY MEDICINE

## 2024-03-01 PROCEDURE — 71045 X-RAY EXAM CHEST 1 VIEW: CPT

## 2024-03-01 RX ORDER — MAGNESIUM HYDROXIDE/ALUMINUM HYDROXICE/SIMETHICONE 120; 1200; 1200 MG/30ML; MG/30ML; MG/30ML
30 SUSPENSION ORAL EVERY 6 HOURS PRN
Status: DISCONTINUED | OUTPATIENT
Start: 2024-03-01 | End: 2024-03-03 | Stop reason: HOSPADM

## 2024-03-01 RX ORDER — LISINOPRIL 10 MG/1
10 TABLET ORAL DAILY
Status: DISCONTINUED | OUTPATIENT
Start: 2024-03-02 | End: 2024-03-03 | Stop reason: HOSPADM

## 2024-03-01 RX ORDER — ONDANSETRON 2 MG/ML
4 INJECTION INTRAMUSCULAR; INTRAVENOUS EVERY 6 HOURS PRN
Status: DISCONTINUED | OUTPATIENT
Start: 2024-03-01 | End: 2024-03-03 | Stop reason: HOSPADM

## 2024-03-01 RX ORDER — PRAVASTATIN SODIUM 10 MG
10 TABLET ORAL
Status: DISCONTINUED | OUTPATIENT
Start: 2024-03-01 | End: 2024-03-03 | Stop reason: HOSPADM

## 2024-03-01 RX ORDER — ACETAMINOPHEN 325 MG/1
650 TABLET ORAL EVERY 6 HOURS PRN
Status: DISCONTINUED | OUTPATIENT
Start: 2024-03-01 | End: 2024-03-03 | Stop reason: HOSPADM

## 2024-03-01 RX ADMIN — SODIUM CHLORIDE 500 ML: 0.9 INJECTION, SOLUTION INTRAVENOUS at 15:13

## 2024-03-01 NOTE — ASSESSMENT & PLAN NOTE
Presents with syncope, eating dinner at a restaurant, stood up and felt lightheaded, sat back down, put her head down on table and passed out briefly for several seconds. Feels fine now. Denied any nausea, sob, chest pain, or any pain preceding her syncopal episode.  Hx of near syncope in the past thought to be due to dehydration versus bradycardia  Previously had a holter monitor but per son at bedside, monitor broke apart when she tried to put it on and did not record anything.  HR 55, /90 on arrival  ECG with sinus bradycardia at 55  Orthostatic negative in ED  Story is very suspicious for orthostasis, however orthostatic VS are negative in ED. Possibly vasovagal versus bradycardia.  Monitor on tele overnight  Repeat orthostatics tomorrow. Ambulate patient and monitor for lightheadedness prior to d/c.  Cardiology consult. Patient would likely benefit from new holter monitor

## 2024-03-01 NOTE — ED NOTES
Primary RN sent report to Heike STARK on MS 3 via [employer].      Gloria Burrell RN  03/01/24 [id number]

## 2024-03-01 NOTE — H&P
Formerly [facility]  H&P  Name: Bea Bean 88 y.o. female I MRN: [id number]  Unit/Bed#: -01 I Date of Admission: 3/1/2024   Date of Service: 3/1/2024  Hospital Day: 0      Assessment/Plan   * Syncope  Assessment & Plan  Presents with syncope, eating dinner at a restaurant, stood up and felt lightheaded, sat back down, put her head down on table and passed out briefly for several seconds. Feels fine now. Denied any nausea, sob, chest pain, or any pain preceding her syncopal episode.  Hx of near syncope in the past thought to be due to dehydration versus bradycardia  Previously had a holter monitor but per son at bedside, monitor broke apart when she tried to put it on and did not record anything.  HR 55, /90 on arrival  ECG with sinus bradycardia at 55  Orthostatic negative in ED  Story is very suspicious for orthostasis, however orthostatic VS are negative in ED. Possibly vasovagal versus bradycardia.  Monitor on tele overnight  Repeat orthostatics tomorrow. Ambulate patient and monitor for lightheadedness prior to d/c.  Cardiology consult. Patient would likely benefit from new holter monitor    CAD (coronary artery disease)  Assessment & Plan  CT chest with coronary artery atherosclerotic disease  Continue statin and aspirin    Essential hypertension  Assessment & Plan  Continue lisinopril 10mg daily    Dyslipidemia  Assessment & Plan  Continue simvastatin 10mg daily (pravastatin 10mg while inpatient)           VTE Pharmacologic Prophylaxis: VTE Score: 4  overnight observation, can hold off on DVT prophylaxis  Code Status: Level 1 - Full Code   Discussion with family: Updated  (son) at bedside.    Anticipated Length of Stay: Patient will be admitted on an observation basis with an anticipated length of stay of less than 2 midnights secondary to syncope.    Total Time Spent on Date of Encounter in care of patient: 55 mins. This time was spent on one or more of the  following: performing physical exam; counseling and coordination of care; obtaining or reviewing history; documenting in the medical record; reviewing/ordering tests, medications or procedures; communicating with other healthcare professionals and discussing with patient's family/caregivers.    Chief Complaint: Syncope    History of Present Illness:  Bea Bean is a 88 y.o. female with a PMH of HTN, HLD, history of near syncope who presents with syncopal event.  Patient reports that she was feeling well, about to eat at a restaurant with her friends, stood up to use the restroom and felt lightheaded and sat back down.  Continued to feel lightheaded and placed her head down on the table where she proceeded to pass out for several seconds.  Denied any preceding symptoms including headache, shortness of breath, chest pain, diaphoresis, nausea, abdominal pain.  She reports a history of near syncope which was previously thought to be secondary to dehydration versus bradycardia.  She previously had a Holter monitor, however sons at bedside report that the monitor broke when she attempted to place it and never obtained a recording.  On arrival to the ED, heart rate is 55, /90.  ECG with HR 55, sinus rhythm.  Orthostatics in the emergency department were negative.  Opponents were negative.  She will be admitted for telemetry monitoring overnight alongside cardiology consultation for possible outpatient Holter monitor.    Review of Systems:  Review of Systems   Constitutional:  Negative for appetite change, chills, fatigue and fever.   HENT:  Negative for ear pain, sore throat and trouble swallowing.    Eyes:  Negative for visual disturbance.   Respiratory:  Negative for cough, chest tightness, shortness of breath and wheezing.    Cardiovascular:  Negative for chest pain, palpitations and leg swelling.   Gastrointestinal:  Negative for abdominal distention, abdominal pain, diarrhea, nausea and vomiting.    Endocrine: Negative.    Genitourinary:  Negative for dysuria, flank pain and hematuria.   Musculoskeletal:  Negative for arthralgias, gait problem and myalgias.   Skin:  Negative for pallor.   Allergic/Immunologic: Negative for immunocompromised state.   Neurological:  Positive for light-headedness. Negative for dizziness, syncope, numbness and headaches.       Past Medical and Surgical History:   Past Medical History:   Diagnosis Date    Breast cancer (HCC)     Right sided       Past Surgical History:   Procedure Laterality Date    BREAST SURGERY      EYE SURGERY  11/19/2019    Left cataract     REPLACEMENT TOTAL KNEE Left 08/04/2021    left knee placment       Meds/Allergies:  Prior to Admission medications    Medication Sig Start Date End Date Taking? Authorizing Provider   aspirin 81 MG tablet Take 1 tablet by mouth daily 1/15/15   Historical Provider, MD   lisinopril (ZESTRIL) 10 mg tablet Take 1 tablet (10 mg total) by mouth daily 3/2/23   MAURICIO Brady   simvastatin (ZOCOR) 10 mg tablet Take 1 tablet (10 mg total) by mouth daily 3/2/23   MAURICIO Brady     I have reviewed home medications with patient personally.    Allergies:   Allergies   Allergen Reactions    Mushroom Extract Complex - Food Allergy Other (See Comments)     syncope       Social History:  Marital Status: /Civil Union   Occupation: Noncontributory  Patient Pre-hospital Living Situation: Home  Patient Pre-hospital Level of Mobility: walks  Patient Pre-hospital Diet Restrictions: None  Substance Use History:   Social History     Substance and Sexual Activity   Alcohol Use Yes    Comment: Red wine daily     Social History     Tobacco Use   Smoking Status Never   Smokeless Tobacco Never   Tobacco Comments    Nonsmoker - As per [employer]      Social History     Substance and Sexual Activity   Drug Use Never       Family History:  Family History   Problem Relation Age of Onset    No Known Problems Family     Coronary artery  disease Father        Physical Exam:     Vitals:   Blood Pressure: 157/60 (03/01/24 1853)  Pulse: 65 (03/01/24 1853)  Temperature: 97.5 °F (36.4 °C) (03/01/24 1449)  Temp Source: Oral (03/01/24 1449)  Respirations: 18 (03/01/24 1853)  SpO2: 95 % (03/01/24 1853)    Physical Exam  Vitals and nursing note reviewed.   Constitutional:       Appearance: Normal appearance.   HENT:      Head: Normocephalic and atraumatic.      Mouth/Throat:      Mouth: Mucous membranes are moist.      Pharynx: Oropharynx is clear. No oropharyngeal exudate.   Eyes:      Extraocular Movements: Extraocular movements intact.   Cardiovascular:      Rate and Rhythm: Normal rate and regular rhythm.      Pulses: Normal pulses.      Heart sounds: Normal heart sounds. No murmur heard.     No friction rub. No gallop.   Pulmonary:      Effort: Pulmonary effort is normal. No respiratory distress.      Breath sounds: Normal breath sounds. No stridor. No wheezing or rales.   Abdominal:      General: Abdomen is flat. Bowel sounds are normal. There is no distension.      Palpations: Abdomen is soft.      Tenderness: There is no abdominal tenderness.   Musculoskeletal:      Right lower leg: No edema.      Left lower leg: No edema.   Skin:     General: Skin is warm and dry.   Neurological:      General: No focal deficit present.      Mental Status: She is alert and oriented to person, place, and time.          Additional Data:     Lab Results:  Results from last 7 days   Lab Units 03/01/24  1441   WBC Thousand/uL 6.85   HEMOGLOBIN g/dL 13.6   HEMATOCRIT % 42.9   PLATELETS Thousands/uL 200   NEUTROS PCT % 57   LYMPHS PCT % 33   MONOS PCT % 7   EOS PCT % 2     Results from last 7 days   Lab Units 03/01/24  1441   SODIUM mmol/L 141   POTASSIUM mmol/L 3.8   CHLORIDE mmol/L 106   CO2 mmol/L 28   BUN mg/dL 24   CREATININE mg/dL 1.07   ANION GAP mmol/L 7   CALCIUM mg/dL 10.8*   ALBUMIN g/dL 4.4   TOTAL BILIRUBIN mg/dL 0.59   ALK PHOS U/L 54   ALT U/L 11   AST U/L 17    GLUCOSE RANDOM mg/dL 148*     Results from last 7 days   Lab Units 03/01/24  1441   INR  0.94                   Lines/Drains:  Invasive Devices       Peripheral Intravenous Line  Duration             Peripheral IV 03/01/24 Left Antecubital <1 day                        Imaging: Reviewed radiology reports from this admission including: chest xray and chest CT scan  CT chest without contrast   Final Result by Estrella Munson MD (03/01 1804)      1.  No pulmonary airspace infiltrates as questioned in prior chest x-ray.   2.  Coronary artery atherosclerotic disease.   3.  Partially imaged significantly distended stomach containing food debris.   4.  Other chronic findings as above.               Workstation performed: [id number]         XR chest 1 view portable   Final Result by Yoel Lau DO (03/01 1624)      Question vague density right suprahilar medial upper lung zone, not clearly seen on the previous study. Underlying infiltrate not excluded.      Recommend follow-up PA and lateral with dual-energy now for further assessment.      The study was marked in EPIC for immediate notification and follow-up.         Resident: MYRON HARRINGTON I, the attending radiologist, have reviewed the images and agree with the final report above.      Workstation performed: [id number]             EKG and Other Studies Reviewed on Admission:   EKG: Sinus Bradycardia. HR 55.    ** Please Note: This note has been constructed using a voice recognition system. **

## 2024-03-01 NOTE — ED PROVIDER NOTES
History  Chief Complaint   Patient presents with    Syncope     Patient reports to ED via EMS after syncopal episode while out to lunch. Denies fall or head strike. EMS reports diaphoresis on arrival. Reports headache and stomach ache at this time.      Patient is an 89 y/o F with h/o Breast Cancer that was BIBA from restaurant after a syncopal event.  She states she was at a restaurant eating dessert and felt lightheaded and put her head down the table and passed out.  She states she did not fall out of her chair.  She is feeling better now, but feels nauseated and has epigastric pain.  According to her prior records patient was seen at PCP in September for near syncopal episodes, was found to have symptomatic bradycardia.  Her Echo showed EF of 60%.  She states she was told to drink more water.  She denies chest pain.  No injuries from the syncopal event.        History provided by:  Patient  Syncope  Associated symptoms: dizziness, headaches and nausea    Associated symptoms: no chest pain, no confusion, no fever, no palpitations, no shortness of breath, no vomiting and no weakness        Prior to Admission Medications   Prescriptions Last Dose Informant Patient Reported? Taking?   aspirin 81 MG tablet  Self Yes No   Sig: Take 1 tablet by mouth daily   lisinopril (ZESTRIL) 10 mg tablet  Self No No   Sig: Take 1 tablet (10 mg total) by mouth daily   simvastatin (ZOCOR) 10 mg tablet  Self No No   Sig: Take 1 tablet (10 mg total) by mouth daily      Facility-Administered Medications: None       Past Medical History:   Diagnosis Date    Breast cancer (HCC)     Right sided       Past Surgical History:   Procedure Laterality Date    BREAST SURGERY      EYE SURGERY  11/19/2019    Left cataract     REPLACEMENT TOTAL KNEE Left 08/04/2021    left knee placment       Family History   Problem Relation Age of Onset    No Known Problems Family     Coronary artery disease Father      I have reviewed and agree with the history  as documented.    E-Cigarette/Vaping    E-Cigarette Use Never User      E-Cigarette/Vaping Substances     Social History     Tobacco Use    Smoking status: Never    Smokeless tobacco: Never    Tobacco comments:     Nonsmoker - As per [employer]    Vaping Use    Vaping status: Never Used   Substance Use Topics    Alcohol use: Yes     Comment: Red wine daily    Drug use: Never       Review of Systems   Constitutional:  Negative for chills and fever.   HENT: Negative.     Respiratory:  Negative for cough and shortness of breath.    Cardiovascular:  Positive for syncope. Negative for chest pain, palpitations and leg swelling.   Gastrointestinal:  Positive for nausea. Negative for abdominal pain, diarrhea and vomiting.   Genitourinary:  Negative for dysuria.   Musculoskeletal:  Negative for back pain and neck pain.   Skin:  Negative for color change, pallor and rash.   Neurological:  Positive for dizziness, syncope, light-headedness and headaches. Negative for weakness and numbness.   Psychiatric/Behavioral:  Negative for confusion.    All other systems reviewed and are negative.      Physical Exam  Physical Exam  Vitals and nursing note reviewed.   Constitutional:       General: She is not in acute distress.     Appearance: Normal appearance. She is well-developed, well-groomed and normal weight. She is not ill-appearing or diaphoretic.   HENT:      Head: Normocephalic and atraumatic.      Right Ear: Hearing normal.      Left Ear: Hearing normal.      Nose: Nose normal.      Mouth/Throat:      Mouth: Mucous membranes are moist.   Eyes:      Conjunctiva/sclera: Conjunctivae normal.      Pupils: Pupils are equal.   Cardiovascular:      Rate and Rhythm: Regular rhythm. Bradycardia present.      Heart sounds: Normal heart sounds.   Pulmonary:      Effort: Pulmonary effort is normal.      Breath sounds: Normal breath sounds. No wheezing, rhonchi or rales.   Musculoskeletal:      Cervical back: Normal range of motion.       Right lower leg: No edema.      Left lower leg: No edema.   Skin:     General: Skin is warm and dry.      Coloration: Skin is not jaundiced or pale.      Findings: No rash.   Neurological:      General: No focal deficit present.      Mental Status: She is alert and oriented to person, place, and time.      Cranial Nerves: No cranial nerve deficit.      Motor: No weakness.   Psychiatric:         Mood and Affect: Mood normal.         Behavior: Behavior is cooperative.         Vital Signs  ED Triage Vitals   Temperature Pulse Respirations Blood Pressure SpO2   03/01/24 1449 03/01/24 1444 03/01/24 1444 03/01/24 1444 03/01/24 1446   97.5 °F (36.4 °C) (!) 53 20 137/65 98 %      Temp Source Heart Rate Source Patient Position - Orthostatic VS BP Location FiO2 (%)   03/01/24 1449 03/01/24 1444 03/01/24 1444 03/01/24 1444 --   Oral Monitor Sitting Right arm       Pain Score       03/01/24 1510       5           Vitals:    03/01/24 1630 03/01/24 1700 03/01/24 1730 03/01/24 1800   BP: 138/63 139/62 156/67 139/63   Pulse: 62 61 64 68   Patient Position - Orthostatic VS:             Visual Acuity      ED Medications  Medications - No data to display      Diagnostic Studies  Results Reviewed       Procedure Component Value Units Date/Time    Urine Microscopic [[id number]]  (Abnormal) Collected: 03/01/24 1623    Lab Status: Edited Result - FINAL Specimen: Urine, Clean Catch Updated: 03/01/24 1715     RBC, UA None Seen /hpf      WBC, UA 1-2 /hpf      Epithelial Cells None Seen /hpf      Bacteria, UA None Seen /hpf      MUCUS THREADS None Seen     Hyaline Casts, UA 1-2 /lpf     HS Troponin I 2hr [[id number]]  (Normal) Collected: 03/01/24 1615    Lab Status: Final result Specimen: Blood from Arm, Left Updated: 03/01/24 1645     hs TnI 2hr 10 ng/L      Delta 2hr hsTnI 1 ng/L     UA w Reflex to Microscopic w Reflex to Culture [[id number]]  (Abnormal) Collected: 03/01/24 1623    Lab Status: Final result Specimen: Urine, Clean Catch Updated:  03/01/24 1629     Color, UA Yellow     Clarity, UA Clear     Specific Gravity, UA 1.025     pH, UA 6.0     Leukocytes, UA Negative     Nitrite, UA Negative     Protein, UA Trace mg/dl      Glucose, UA Negative mg/dl      Ketones, UA Negative mg/dl      Urobilinogen, UA <2.0 mg/dl      Bilirubin, UA Negative     Occult Blood, UA Negative    B-Type Natriuretic Peptide(BNP) [[id number]]  (Abnormal) Collected: 03/01/24 1441    Lab Status: Final result Specimen: Blood from Arm, Left Updated: 03/01/24 1628      pg/mL     TSH, 3rd generation with Free T4 reflex [[id number]]  (Normal) Collected: 03/01/24 1441    Lab Status: Final result Specimen: Blood from Arm, Left Updated: 03/01/24 1608     TSH 3RD GENERATON 1.552 uIU/mL     Lipase [[id number]]  (Normal) Collected: 03/01/24 1441    Lab Status: Final result Specimen: Blood from Arm, Left Updated: 03/01/24 1608     Lipase 50 u/L     HS Troponin I 4hr [[id number]]     Lab Status: No result Specimen: Blood     Protime-INR [[id number]]  (Normal) Collected: 03/01/24 1441    Lab Status: Final result Specimen: Blood Updated: 03/01/24 1513     Protime 13.0 seconds      INR 0.94    APTT [[id number]]  (Normal) Collected: 03/01/24 1441    Lab Status: Final result Specimen: Blood Updated: 03/01/24 1513     PTT 25 seconds     HS Troponin 0hr (reflex protocol) [[id number]]  (Normal) Collected: 03/01/24 1441    Lab Status: Final result Specimen: Blood from Arm, Left Updated: 03/01/24 1509     hs TnI 0hr 9 ng/L     Comprehensive metabolic panel [[id number]]  (Abnormal) Collected: 03/01/24 1441    Lab Status: Final result Specimen: Blood from Arm, Left Updated: 03/01/24 1502     Sodium 141 mmol/L      Potassium 3.8 mmol/L      Chloride 106 mmol/L      CO2 28 mmol/L      ANION GAP 7 mmol/L      BUN 24 mg/dL      Creatinine 1.07 mg/dL      Glucose 148 mg/dL      Calcium 10.8 mg/dL      AST 17 U/L      ALT 11 U/L      Alkaline Phosphatase 54 U/L      Total Protein 7.1 g/dL      Albumin 4.4  g/dL      Total Bilirubin 0.59 mg/dL      eGFR 46 ml/min/1.73sq m     Narrative:      National Kid[employer] Foundation guidelines for Chronic Kidney Disease (CKD):     Stage 1 with normal or high GFR (GFR > 90 mL/min/1.73 square meters)    Stage 2 Mild CKD (GFR = 60-89 mL/min/1.73 square meters)    Stage 3A Moderate CKD (GFR = 45-59 mL/min/1.73 square meters)    Stage 3B Moderate CKD (GFR = 30-44 mL/min/1.73 square meters)    Stage 4 Severe CKD (GFR = 15-29 mL/min/1.73 square meters)    Stage 5 End Stage CKD (GFR <15 mL/min/1.73 square meters)  Note: GFR calculation is accurate only with a steady state creatinine    CBC and differential [[id number]]  (Abnormal) Collected: 03/01/24 1441    Lab Status: Final result Specimen: Blood from Arm, Left Updated: 03/01/24 1447     WBC 6.85 Thousand/uL      RBC 4.23 Million/uL      Hemoglobin 13.6 g/dL      Hematocrit 42.9 %       fL      MCH 32.2 pg      MCHC 31.7 g/dL      RDW 12.0 %      MPV 10.6 fL      Platelets 200 Thousands/uL      nRBC 0 /100 WBCs      Neutrophils Relative 57 %      Immat GRANS % 0 %      Lymphocytes Relative 33 %      Monocytes Relative 7 %      Eosinophils Relative 2 %      Basophils Relative 1 %      Neutrophils Absolute 3.96 Thousands/µL      Immature Grans Absolute 0.01 Thousand/uL      Lymphocytes Absolute 2.24 Thousands/µL      Monocytes Absolute 0.48 Thousand/µL      Eosinophils Absolute 0.10 Thousand/µL      Basophils Absolute 0.06 Thousands/µL                    CT chest without contrast   Final Result by Estrella Munson MD (03/01 1804)      1.  No pulmonary airspace infiltrates as questioned in prior chest x-ray.   2.  Coronary artery atherosclerotic disease.   3.  Partially imaged significantly distended stomach containing food debris.   4.  Other chronic findings as above.               Workstation performed: [id number]         XR chest 1 view portable   Final Result by Yoel Lau DO (03/01 1624)      Question vague density  right suprahilar medial upper lung zone, not clearly seen on the previous study. Underlying infiltrate not excluded.      Recommend follow-up PA and lateral with dual-energy now for further assessment.      The study was marked in EPIC for immediate notification and follow-up.         Resident: MYRON HARRINGTON I, the attending radiologist, have reviewed the images and agree with the final report above.      Workstation performed: [id number]                    Procedures  ECG 12 Lead Documentation Only    Date/Time: 3/1/2024 2:52 PM    Performed by: Lee Ann Guy PA-C  Authorized by: Lee Ann Guy PA-C    Indications / Diagnosis:  Syncope  ECG reviewed by me, the ED Provider: yes    Patient location:  ED  Previous ECG:     Previous ECG:  Compared to current    Similarity:  No change  Rate:     ECG rate:  55  Rhythm:     Rhythm: sinus bradycardia    Conduction:     Conduction: normal    ST segments:     ST segments:  Normal  T waves:     T waves: normal             ED Course  ED Course as of 03/01/24 1839   Fri Mar 01, 2024   1635 Abnormality on CXR, will obtain CT chest.                                SBIRT 20yo+      Flowsheet Row Most Recent Value   Initial Alcohol Screen: US AUDIT-C     1. How often do you have a drink containing alcohol? 0 Filed at: 03/01/2024 1439   2. How many drinks containing alcohol do you have on a typical day you are drinking?  0 Filed at: 03/01/2024 1439   3a. Male UNDER 65: How often do you have five or more drinks on one occasion? 0 Filed at: 03/01/2024 1439   3b. FEMALE Any Age, or MALE 65+: How often do you have 4 or more drinks on one occassion? 0 Filed at: 03/01/2024 1439   Audit-C Score 0 Filed at: 03/01/2024 1439   ALPA: How many times in the past year have you...    Used an illegal drug or used a prescription medication for non-medical reasons? Never Filed at: 03/01/2024 1439                      Medical Decision Making  Patient with syncopal episode,  bradycardia, will order labs, EKG, CXR to r/o cardiac arrhythmia, heart block, ACS, anemia, electrolyte abnormality.  Patient with abnormality on CXR, will order CT scan to further evaluate.  No acute abnormalities on CT scan, no concern for pneumonia.  Will admit patient given syncope, jorge luis for further monitoring.     Amount and/or Complexity of Data Reviewed  External Data Reviewed: ECG.  Labs: ordered.  Radiology: ordered.  ECG/medicine tests: ordered and independent interpretation performed.    Risk  Decision regarding hospitalization.             Disposition  Final diagnoses:   Syncope   Sinus bradycardia     Time reflects when diagnosis was documented in both MDM as applicable and the Disposition within this note       Time User Action Codes Description Comment    3/1/2024  3:01 PM Lee Ann Guy [R55] Syncope     3/1/2024  3:01 PM Lee Ann Guy [R00.1] Sinus bradycardia           ED Disposition       ED Disposition   Admit    Condition   Stable    Date/Time   Fri Mar 1, 2024 1818    Comment   Case was discussed with Carlito  and the patient's admission status was agreed to be Admission Status: observation status to the service of Dr. Adam .               Follow-up Information    None         Patient's Medications   Discharge Prescriptions    No medications on file       No discharge procedures on file.    PDMP Review       None            ED Provider  Electronically Signed by             Lee Ann Guy PA-C  03/01/24 [id number]

## 2024-03-02 PROCEDURE — 99232 SBSQ HOSP IP/OBS MODERATE 35: CPT | Performed by: HOSPITALIST

## 2024-03-02 PROCEDURE — 99222 1ST HOSP IP/OBS MODERATE 55: CPT | Performed by: INTERNAL MEDICINE

## 2024-03-02 RX ADMIN — ASPIRIN 81 MG: 81 TABLET, COATED ORAL at 08:50

## 2024-03-02 RX ADMIN — PRAVASTATIN SODIUM 10 MG: 10 TABLET ORAL at 16:13

## 2024-03-02 RX ADMIN — LISINOPRIL 10 MG: 10 TABLET ORAL at 08:49

## 2024-03-02 NOTE — UTILIZATION REVIEW
Initial Clinical Review    WAS OBSERVATION 3/1/24 @ 1819 CONVERTED TO INPATIENT ADMISSION 3/2/24 @ 1518 DUE TO CONTINUED STAY REQUIRED TO CARE FOR PATIENT WITH DX: SYNCOPE.    Admission: Date/Time/Statement:   Admission Orders (From admission, onward)       Ordered        03/02/24 1518  Inpatient Admission  Once            03/01/24 1819  Place in Observation  Once                          Orders Placed This Encounter   Procedures    Inpatient Admission     Standing Status:   Standing     Number of Occurrences:   1     Order Specific Question:   Level of Care     Answer:   Med Surg [16]     Order Specific Question:   Estimated length of stay     Answer:   More than 2 Midnights     Order Specific Question:   Certification     Answer:   I certify that inpatient services are medically necessary for this patient for a duration of greater than two midnights. See H&P and MD Progress Notes for additional information about the patient's course of treatment.     ED Arrival Information       Expected   -    Arrival   3/1/2024 14:35    Acuity   Urgent              Means of arrival   Ambulance    Escorted by   [facility] [facility] ([place])    Service   Hospitalist    Admission type   Emergency              Arrival complaint   medical problem             Chief Complaint   Patient presents with    Syncope     Patient reports to ED via EMS after syncopal episode while out to lunch. Denies fall or head strike. EMS reports diaphoresis on arrival. Reports headache and stomach ache at this time.        Initial Presentation: 88 y.o. female to ED via EMS from home   Present to ED with syncopal event.  Eating dinner at a restaurant, stood up and felt lightheaded, sat back down, put her head down on table and passed out briefly for several seconds.   PMHX HTN, HLD, history of near syncope    Admitted to OBS with DX: Syncope  on exam: Alessio; hypertensive  PLAN: tele monitoring; monitor labs; orthostatic BP's; cardiology consult     Date: 3/2/24  -  CHANGED TO INPATIENT   Pt has not had any further syncopal episode. Needs 2 week zio patch   Plan: tele monitoring; monitor labs    CARDIOLOGY CONSULT  Reviewed telemetry which demonstrates predominant sinus bradycardia which she has had previously. There is no evidence of high degree AV block or significant sinus pause. Patient is concerned that she was exposed to mushroom which prompted episode. This is unclear. She has had 2 prior attempts at a Zio patch. She is willing to try this again. Favor monitoring her overnight and if there is no significant arrhythmia and she is asymptomatic she can be discharged with arrangement made for a 2 weeks Zio patch.     Date: 3/3/24     Day 3: Has surpassed a 2nd midnight with active treatments and services.  Bradycardia noted on tele 3/2 PM, question if contributory. Will review with cards.   Plan: tele monitoring; monitor labs      ED Triage Vitals   Temperature Pulse Respirations Blood Pressure SpO2   03/01/24 1449 03/01/24 1444 03/01/24 1444 03/01/24 1444 03/01/24 1446   97.5 °F (36.4 °C) (!) 53 20 137/65 98 %      Temp Source Heart Rate Source Patient Position - Orthostatic VS BP Location FiO2 (%)   03/01/24 1449 03/01/24 1444 03/01/24 1444 03/01/24 1444 --   Oral Monitor Sitting Right arm       Pain Score       03/01/24 1510       5          Wt Readings from Last 1 Encounters:   03/01/24 68.9 kg (152 lb)     Additional Vital Signs:   Date/Time Temp Pulse Resp BP MAP (mmHg) SpO2 O2 Device Patient Position - Orthostatic VS   03/03/24 09:58:53 -- 72 -- 168/72 104 97 % -- --   03/03/24 09:57:33 -- 67 -- 169/72 104 96 % -- --   03/03/24 09:54:49 -- 64 -- 155/70 98 96 % -- --   03/03/24 0900 -- -- -- -- -- -- None (Room air) --   03/03/24 07:48:55 97.3 °F (36.3 °C) Abnormal  48 Abnormal  -- 134/62 86 96 % -- --   03/02/24 21:03:14 97.8 °F (36.6 °C) 55 16 144/60 88 96 % -- --   03/02/24 1921 -- -- -- -- -- 96 % None (Room air) --   03/02/24 16:04:23 97.8 °F (36.6 °C) 59 16  133/57 82 98 % None (Room air) Lying   03/02/24 1100 -- -- -- -- -- -- None (Room air) --   03/02/24 06:46:44 98 °F (36.7 °C) 78 -- 166/75 105 95 %       Date/Time Temp Pulse Resp BP MAP (mmHg) SpO2 O2 Device Patient Position - Orthostatic VS   03/02/24 06:46:44 98 °F (36.7 °C) 78 -- 166/75 105 95 % -- --   03/02/24 06:42:36 -- 71 -- 166/75 105 96 % None (Room air) Standing - Orthostatic VS   03/02/24 06:41:37 -- 66 -- 162/78 106 96 % -- Sitting - Orthostatic VS   03/02/24 06:40:23 -- 69 -- 152/65 94 97 % -- Lying - Orthostatic VS   03/01/24 21:07:55 97.4 °F (36.3 °C) Abnormal  75 20 157/62 94 96 % None (Room air) Lying   03/01/24 21:05:37 97.4 °F (36.3 °C) Abnormal  61 -- 157/62 94 96 % -- --   03/01/24 1909 -- -- -- -- -- 94 % -- --   03/01/24 1900 97.9 °F (36.6 °C) 65 -- 157/60 92 95 % None (Room air) --   03/01/24 18:53:07 -- 65 18 157/60 92 95 % -- --   03/01/24 1800 -- 68 20 139/63 90 97 % -- --   03/01/24 1730 -- 64 20 156/67 97 100 % None (Room air) --   03/01/24 1700 -- 61 20 139/62 89 96 % -- --   03/01/24 1630 -- 62 20 138/63 91 97 % -- --   03/01/24 1600 -- 63 20 163/71 102 99 % -- --   03/01/24 1530 -- 59 20 154/67 96 100 % -- --   03/01/24 1523 -- 81 20 155/67 -- -- -- Standing - Orthostatic VS   03/01/24 1521 -- 74 20 156/70 -- -- -- Sitting - Orthostatic VS   03/01/24 1520 -- 62 20 142/63 -- -- -- Lying - Orthostatic VS   03/01/24 1510 -- -- -- -- -- -- None (Room air) --   03/01/24 1500 -- 59 20 161/70 100 98 % None (Room air) --   03/01/24 1449 97.5 °F (36.4 °C) -- -- -- -- -- -- --   03/01/24 1446 -- -- -- -- -- 98 % None (Room air) --   03/01/24 1444 -- 53 Abnormal  20 137/65 -- -- -- Sitting         EKG: Sinus bradycardia  Low voltage QRS  Cannot rule out Anterior infarct , age undetermined  Abnormal ECG  When compared with ECG of 18-DEC-2014 05:39,  No significant change was found      Pertinent Labs/Diagnostic Test Results:   CT chest without contrast   Final Result by Estrella Munson MD (03/01  3622)      1.  No pulmonary airspace infiltrates as questioned in prior chest x-ray.   2.  Coronary artery atherosclerotic disease.   3.  Partially imaged significantly distended stomach containing food debris.   4.  Other chronic findings as above.               Workstation performed: [id number]         XR chest 1 view portable   Final Result by Yoelmillicent Lau DO (03/01 1624)      Question vague density right suprahilar medial upper lung zone, not clearly seen on the previous study. Underlying infiltrate not excluded.      Recommend follow-up PA and lateral with dual-energy now for further assessment.      The study was marked in EPIC for immediate notification and follow-up.         Resident: MYRON HARRINGTON I, the attending radiologist, have reviewed the images and agree with the final report above.      Workstation performed: [id number]               Results from last 7 days   Lab Units 03/03/24  0320 03/01/24  1441   WBC Thousand/uL 4.62 6.85   HEMOGLOBIN g/dL 11.2* 13.6   HEMATOCRIT % 34.8 42.9   PLATELETS Thousands/uL 138* 200   NEUTROS ABS Thousands/µL 1.90 3.96         Results from last 7 days   Lab Units 03/03/24  0320 03/01/24  1441   SODIUM mmol/L 140 141   POTASSIUM mmol/L 3.9 3.8   CHLORIDE mmol/L 110* 106   CO2 mmol/L 26 28   ANION GAP mmol/L 4 7   BUN mg/dL 27* 24   CREATININE mg/dL 0.92 1.07   EGFR ml/min/1.73sq m 55 46   CALCIUM mg/dL 9.9 10.8*     Results from last 7 days   Lab Units 03/03/24  0320 03/01/24  1441   AST U/L 16 17   ALT U/L 12 11   ALK PHOS U/L 43 54   TOTAL PROTEIN g/dL 5.7* 7.1   ALBUMIN g/dL 3.6 4.4   TOTAL BILIRUBIN mg/dL 0.28 0.59       Results from last 7 days   Lab Units 03/03/24  0320 03/01/24  1441   GLUCOSE RANDOM mg/dL 100 148*       Results from last 7 days   Lab Units 03/01/24  1950 03/01/24  1615 03/01/24  1441   HS TNI 0HR ng/L  --   --  9   HS TNI 2HR ng/L  --  10  --    HSTNI D2 ng/L  --  1  --    HS TNI 4HR ng/L 10  --   --    HSTNI D4 ng/L 1  --   --          Results from last 7 days   Lab Units 03/01/24  1441   PROTIME seconds 13.0   INR  0.94   PTT seconds 25     Results from last 7 days   Lab Units 03/01/24  1441   TSH 3RD GENERATON uIU/mL 1.552      Results from last 7 days   Lab Units 03/01/24  1441   BNP pg/mL 124*      Results from last 7 days   Lab Units 03/01/24  1441   LIPASE u/L 50        Results from last 7 days   Lab Units 03/01/24  1623   CLARITY UA  Clear   COLOR UA  Yellow   SPEC GRAV UA  1.025   PH UA  6.0   GLUCOSE UA mg/dl Negative   KETONES UA mg/dl Negative   BLOOD UA  Negative   PROTEIN UA mg/dl Trace*   NITRITE UA  Negative   BILIRUBIN UA  Negative   UROBILINOGEN UA (BE) mg/dl <2.0   LEUKOCYTES UA  Negative   WBC UA /hpf 1-2   RBC UA /hpf None Seen   BACTERIA UA /hpf None Seen   EPITHELIAL CELLS WET PREP /hpf None Seen   MUCUS THREADS  None Seen       ED Treatment:   Medication Administration from 03/01/2024 1434 to 03/01/2024 1843         Date/Time Order Dose Route Action     03/01/2024 1513 EST sodium chloride 0.9 % bolus 500 mL 500 mL Intravenous New Bag            Present on Admission:   Dyslipidemia   Essential hypertension   Syncope      Admitting Diagnosis: Syncope [R55]  Sinus bradycardia [R00.1]  Known medical problems [Z78.9]    Age/Sex: 88 y.o. female    Admission Orders: SCDs; tele monitoring; regular diet; orthostatic bp's     Scheduled Medications:  aspirin, 81 mg, Oral, Daily  lisinopril, 10 mg, Oral, Daily  pravastatin, 10 mg, Oral, Daily With Dinner      Continuous IV Infusions: None       PRN Meds:  acetaminophen, 650 mg, Oral, Q6H PRN  aluminum-magnesium hydroxide-simethicone, 30 mL, Oral, Q6H PRN  ondansetron, 4 mg, Intravenous, Q6H PRN        IP CONSULT TO CARDIOLOGY    Network Utilization Review Department  ATTENTION: Please call with any questions or concerns to [phone] and carefully listen to the prompts so that you are directed to the right person. All voicemails are confidential.   For Discharge needs, contact  Care Management DC Support Team at [phone] opt. 2  Send all requests for admission clinical reviews, approved or denied determinations and any other requests to dedicated fax number below belonging to the campus where the patient is receiving treatment. List of dedicated fax numbers for the Facilities:  FACILITY NAME UR FAX NUMBER   ADMISSION DENIALS (Administrative/Medical Necessity) [phone]   DISCHARGE SUPPORT TEAM (NETWORK) [phone]   PARENT CHILD HEALTH (Maternity/NICU/Pediatrics) [phone]   [facility] [phone]   [facility] [phone]   [facility] hospital [phone]   [facility] [phone]   [facility] [phone]   [facility] [phone]   [facility] [phone]   [facility] [phone]   [facility] [phone]   [facility] [phone]   [facility] [phone]   [facility] [phone]

## 2024-03-02 NOTE — CONSULTS
Consultation - Cardiology   Bea Bean 88 y.o. female MRN: [id number]  Unit/Bed#: -01 Encounter: [id number]      Assessment:  Principal Problem:    Syncope  Active Problems:    Dyslipidemia    Essential hypertension    CAD (coronary artery disease)      Plan:  88-year-old with episode of syncope at a buffet.  Patient feels well now.  Reviewed telemetry which demonstrates predominant sinus bradycardia which she has had previously.  There is no evidence of high degree AV block or significant sinus pause.  Patient is concerned that she was exposed to mushroom which prompted episode.  This is unclear.  She has had 2 prior attempts at a Zio patch.  She is willing to try this again.  Favor monitoring her overnight and if there is no significant arrhythmia and she is asymptomatic she can be discharged with arrangement made for a 2 weeks Zio patch.  She has had difficulty placing this herself and I told her the office could do this for her.  She will follow-up with her primary cardiologist Dr. Joyner thereafter.  Thank you for this consultation and allowing me to participate in her care.    History of Present Illness   Physician Requesting Consult: Valerie Adam MD  Reason for Consult / Principal Problem: Syncope  HPI: Bea Bean is a 88 y.o. year old female who presents with syncope.  Patient known to our practice.  Had been seen previously in reference to episodes of near syncope.  Holter monitor done June 2022 demonstrated NSR with average heart rate of 57.  Sinus bradycardia was noted predominantly without evidence of sinus pause or high degree AV block.  Echocardiogram done January 2024 demonstrated LVEF of 60% with aortic valve sclerosis and no significant functional valve disease.  On this occasion patient was eating dinner at a restaurant.  She was attending a buffet.  She stood up and felt lightheaded.  She had nausea.  She put her head down and felt that she had passed out for several seconds.   "Patient tells me that she is sensitive to mushroom and is worried that she may have had exposure to this at the buffet.  This has caused her in the past to have similar episodes.  At the present time she is comfortable.  BMP demonstrated potassium of 3.8 and creatinine of 1.07.  Troponins were negative.  Hemoglobin was 13.6.  TSH was normal.  CAT scan of the chest revealed no significant pulmonary pathology.  Gastric distention was noted.  There was no evidence of congestive failure.  EKG on presentation demonstrated sinus bradycardia at rate of 55 with nonspecific ST segment changes with no significant change compared to a prior tracing done 12/18/2014.    Inpatient consult to Cardiology  Consult performed by: Shawn Larios MD  Consult ordered by: James Rodriguez PA-C          Review of Systems:  Review of Systems - Negative     Historical Information   Past Medical History:   Diagnosis Date    Breast cancer (HCC)     Right sided     Past Surgical History:   Procedure Laterality Date    BREAST SURGERY      EYE SURGERY  11/19/2019    Left cataract     REPLACEMENT TOTAL KNEE Left 08/04/2021    left knee placment     Social History     Substance and Sexual Activity   Alcohol Use Yes    Comment: Red wine daily     Social History     Substance and Sexual Activity   Drug Use Never     Social History     Tobacco Use   Smoking Status Never   Smokeless Tobacco Never   Tobacco Comments    Nonsmoker - As per [employer]      Family History: non-contributory    Meds/Allergies   all current active meds have been reviewed  Allergies   Allergen Reactions    Mushroom Extract Complex - Food Allergy Other (See Comments)     syncope       Objective   Vitals: Blood pressure 166/75, pulse 78, temperature 98 °F (36.7 °C), resp. rate 20, height 5' 5\" (1.651 m), weight 68.9 kg (152 lb), SpO2 95%., Body mass index is 25.29 kg/m².,   Orthostatic Blood Pressures      Flowsheet Row Most Recent Value   Blood Pressure 166/75 filed " at 03/02/2024 0646   Patient Position - Orthostatic VS Standing - Orthostatic VS filed at 03/02/2024 0642            No intake or output data in the 24 hours ending 03/02/24 1054    Invasive Devices       Peripheral Intravenous Line  Duration             Peripheral IV 03/01/24 Left Antecubital <1 day                    Physical Exam  Vitals reviewed.   Constitutional:       Appearance: She is well-developed.   HENT:      Head: Normocephalic and atraumatic.   Eyes:      Conjunctiva/sclera: Conjunctivae normal.      Pupils: Pupils are equal, round, and reactive to light.   Cardiovascular:      Rate and Rhythm: Normal rate.      Heart sounds: Normal heart sounds.   Pulmonary:      Effort: Pulmonary effort is normal.      Breath sounds: Normal breath sounds.   Skin:     General: Skin is warm and dry.   Neurological:      Mental Status: She is alert and oriented to person, place, and time.         Lab Results:   Admission on 03/01/2024   Component Date Value    WBC 03/01/2024 6.85     RBC 03/01/2024 4.23     Hemoglobin 03/01/2024 13.6     Hematocrit 03/01/2024 42.9     MCV 03/01/2024 101 (H)     MCH 03/01/2024 32.2     MCHC 03/01/2024 31.7     RDW 03/01/2024 12.0     MPV 03/01/2024 10.6     Platelets 03/01/2024 200     nRBC 03/01/2024 0     Neutrophils Relative 03/01/2024 57     Immat GRANS % 03/01/2024 0     Lymphocytes Relative 03/01/2024 33     Monocytes Relative 03/01/2024 7     Eosinophils Relative 03/01/2024 2     Basophils Relative 03/01/2024 1     Neutrophils Absolute 03/01/2024 3.96     Immature Grans Absolute 03/01/2024 0.01     Lymphocytes Absolute 03/01/2024 2.24     Monocytes Absolute 03/01/2024 0.48     Eosinophils Absolute 03/01/2024 0.10     Basophils Absolute 03/01/2024 0.06     Sodium 03/01/2024 141     Potassium 03/01/2024 3.8     Chloride 03/01/2024 106     CO2 03/01/2024 28     ANION GAP 03/01/2024 7     BUN 03/01/2024 24     Creatinine 03/01/2024 1.07     Glucose 03/01/2024 148 (H)     Calcium  03/01/2024 10.8 (H)     AST 03/01/2024 17     ALT 03/01/2024 11     Alkaline Phosphatase 03/01/2024 54     Total Protein 03/01/2024 7.1     Albumin 03/01/2024 4.4     Total Bilirubin 03/01/2024 0.59     eGFR 03/01/2024 46     hs TnI 0hr 03/01/2024 9     Ventricular Rate 03/01/2024 55     Atrial Rate 03/01/2024 55     [place] Interval 03/01/2024 136     QRSD Interval 03/01/2024 74     QT Interval 03/01/2024 424     QTC Interval 03/01/2024 405     P [place] 03/01/2024 47     QRS Axis 03/01/2024 0     T Wave [place] 03/01/2024 30     Protime 03/01/2024 13.0     INR 03/01/2024 0.94     PTT 03/01/2024 25     TSH 3RD GENERATON 03/01/2024 1.552     Color, UA 03/01/2024 Yellow     Clarity, UA 03/01/2024 Clear     Specific Gravity, UA 03/01/2024 1.025     pH, UA 03/01/2024 6.0     Leukocytes, UA 03/01/2024 Negative     Nitrite, UA 03/01/2024 Negative     Protein, UA 03/01/2024 Trace (A)     Glucose, UA 03/01/2024 Negative     Ketones, UA 03/01/2024 Negative     Urobilinogen, UA 03/01/2024 <2.0     Bilirubin, UA 03/01/2024 Negative     Occult Blood, UA 03/01/2024 Negative     Lipase 03/01/2024 50     hs TnI 2hr 03/01/2024 10     Delta 2hr hsTnI 03/01/2024 1     BNP 03/01/2024 124 (H)     hs TnI 4hr 03/01/2024 10     Delta 4hr hsTnI 03/01/2024 1     RBC, UA 03/01/2024 None Seen     WBC, UA 03/01/2024 1-2     Epithelial Cells 03/01/2024 None Seen     Bacteria, UA 03/01/2024 None Seen     MUCUS THREADS 03/01/2024 None Seen     Hyaline Casts, UA 03/01/2024 1-2 (A)        Imaging: I have personally reviewed pertinent reports.    CT chest without contrast    Result Date: 3/1/2024  Narrative: CT CHEST WITHOUT IV CONTRAST INDICATION: abnormality on CXR right lung. COMPARISON: Same day chest x-ray. TECHNIQUE: CT examination of the chest was performed without intravenous contrast. Multiplanar 2D reformatted images were created from the source data. This examination, like all CT scans performed in the [facility], was performed  utilizing techniques to minimize radiation dose exposure, including the use of iterative reconstruction and automated exposure control. Radiation dose length product (DLP) for this visit: 204.21 mGy-cm FINDINGS: LUNGS: Tracheobronchial tree is unremarkable. Platelike atelectasis/scarring in the lingula and right middle lobe. No focal infiltrate. PLEURA: Unremarkable. There is small fat-containing right-sided diaphragmatic hernia. HEART/GREAT VESSELS: Coronary artery atherosclerotic disease. No thoracic aortic aneurysm. MEDIASTINUM AND MISAEL: Unremarkable. CHEST WALL AND LOWER NECK: Postsurgical change in the right breast and right axilla VISUALIZED STRUCTURES IN THE UPPER ABDOMEN: Right hepatic lobe cyst, the largest measures 4.8 x 3.9 cm. Partially imaged significantly distended stomach containing food debris. OSSEOUS STRUCTURES: Exaggerated thoracic kyphosis with apex at T8-9. Multiple chronic left rib cage fracture. No acute fracture or destructive osseous lesion.     Impression: 1.  No pulmonary airspace infiltrates as questioned in prior chest x-ray. 2.  Coronary artery atherosclerotic disease. 3.  Partially imaged significantly distended stomach containing food debris. 4.  Other chronic findings as above. Workstation performed: [id number]     XR chest 1 view portable    Result Date: 3/1/2024  Narrative: XR CHEST PORTABLE INDICATION: Syncope, chest pain and shortness of breath. COMPARISON: Radiographs of the chest 12/17/2014 FINDINGS: Monitoring leads and clips project over the chest. Surgical clips right axilla. Question vague density in the right suprahilar medial upper lung zone, not clearly seen on the previous study. The left lung is clear. No pneumothorax or pleural effusion. Normal cardiomediastinal silhouette. Old fracture deformities of the posterior left ribs. Bones are otherwise unremarkable for age. Normal upper abdomen.     Impression: Question vague density right suprahilar medial upper lung zone,  not clearly seen on the previous study. Underlying infiltrate not excluded. Recommend follow-up PA and lateral with dual-energy now for further assessment. The study was marked in EPIC for immediate notification and follow-up. Resident: MYRON HARRINGTON I, the attending radiologist, have reviewed the images and agree with the final report above. Workstation performed: [id number]       EKG: Personally reviewed by Shawn Larios MD     Counseling / Coordination of Care  Total floor / unit time spent today 30 minutes.  Greater than 50% of total time was spent with the patient and / or family counseling and / or coordination of care.

## 2024-03-02 NOTE — PLAN OF CARE

## 2024-03-02 NOTE — PLAN OF CARE

## 2024-03-02 NOTE — ASSESSMENT & PLAN NOTE
Presents with syncope, eating dinner at a restaurant, stood up and felt lightheaded, sat back down, put her head down on table and passed out briefly for several seconds. Feels fine now. Denied any nausea, sob, chest pain, or any pain preceding her syncopal episode.  Hx of near syncope in the past thought to be due to dehydration versus bradycardia  Previously had a holter monitor but per son at bedside, monitor broke apart when she tried to put it on and did not record anything.  HR 55, /90 on arrival  ECG with sinus bradycardia at 55  Orthostatics negative    Monitor on tele overnight  Cardiology consult appreciated. Plan will be for 2 week zio patch after discharge.

## 2024-03-02 NOTE — PROGRESS NOTES
[facility]  Progress Note  Name: Bea Bean I  MRN: [id number]  Unit/Bed#: -01 I Date of Admission: 3/1/2024   Date of Service: 3/2/2024 I Hospital Day: 0    Assessment/Plan   CAD (coronary artery disease)  Assessment & Plan  CT chest with coronary artery atherosclerotic disease  Continue statin and aspirin    Essential hypertension  Assessment & Plan  Continue lisinopril 10mg daily    Dyslipidemia  Assessment & Plan  Continue simvastatin 10mg daily (pravastatin 10mg while inpatient)    * Syncope  Assessment & Plan  Presents with syncope, eating dinner at a restaurant, stood up and felt lightheaded, sat back down, put her head down on table and passed out briefly for several seconds. Feels fine now. Denied any nausea, sob, chest pain, or any pain preceding her syncopal episode.  Hx of near syncope in the past thought to be due to dehydration versus bradycardia  Previously had a holter monitor but per son at bedside, monitor broke apart when she tried to put it on and did not record anything.  HR 55, /90 on arrival  ECG with sinus bradycardia at 55  Orthostatics negative    Monitor on tele overnight  Cardiology consult appreciated. Plan will be for 2 week zio patch after discharge.            VTE  Prophylaxis:   Pharmacologic: in place  Mechanical VTE Prophylaxis in Place: Yes    Patient Centered Rounds: I have performed bedside rounds with nursing staff today.    Discussions with Specialists or Other Care Team Provider: case management    Education and Discussions with Family / Patient: pt    Mobility:   Basic Mobility Inpatient Raw Score: 24  JH-HLM Goal: 8: Walk 250 feet or more  JH-HLM Achieved: 7: Walk 25 feet or more  HLM Goal achieved. Continue to encourage appropriate mobility.    Total Time Spent on Date of Encounter in care of patient: 33 mins. This time was spent on one or more of the following: performing physical exam; counseling and coordination of care;  obtaining or reviewing history; documenting in the medical record; reviewing/ordering tests, medications or procedures; communicating with other healthcare professionals and discussing with patient's family/caregivers.      Current Length of Stay: 0 day(s)    Current Patient Status: Observation        Code Status: Level 1 - Full Code    Discharge Plan: Pt will require continued inpatient hospitalization.    Subjective:   Pt has not had any further syncopal episode   Doing well today without chest pain or other complaints    Patient is seen and examined at bedside.  All other ROS are negative.    Objective:     Vitals:   Temp (24hrs), Av.6 °F (36.4 °C), Min:97.4 °F (36.3 °C), Max:98 °F (36.7 °C)    Temp:  [97.4 °F (36.3 °C)-98 °F (36.7 °C)] 98 °F (36.7 °C)  HR:  [53-81] 78  Resp:  [18-20] 20  BP: (137-166)/(60-78) 166/75  SpO2:  [94 %-100 %] 95 %  Body mass index is 25.29 kg/m².     Input and Output Summary (last 24 hours):     No intake or output data in the 24 hours ending 24 1338    Physical Exam:       GEN: No acute distress, comfortable  HEEENT: No JVD, PERRLA, no scleral icterus  RESP: Lungs clear to auscultation bilaterally  CV: RRR, +s1/s2   ABD: SOFT NON TENDER, POSITIVE BOWEL SOUNDS, NO DISTENTION  PSYCH: CALM  NEURO: Mentation baseline, NO FOCAL DEFICITS  SKIN: NO RASH  EXTREM: NO EDEMA    Additional Data:     Labs:    Results from last 7 days   Lab Units 24  1441   WBC Thousand/uL 6.85   HEMOGLOBIN g/dL 13.6   HEMATOCRIT % 42.9   PLATELETS Thousands/uL 200   NEUTROS PCT % 57   LYMPHS PCT % 33   MONOS PCT % 7   EOS PCT % 2     Results from last 7 days   Lab Units 24  1441   SODIUM mmol/L 141   POTASSIUM mmol/L 3.8   CHLORIDE mmol/L 106   CO2 mmol/L 28   BUN mg/dL 24   CREATININE mg/dL 1.07   ANION GAP mmol/L 7   CALCIUM mg/dL 10.8*   ALBUMIN g/dL 4.4   TOTAL BILIRUBIN mg/dL 0.59   ALK PHOS U/L 54   ALT U/L 11   AST U/L 17   GLUCOSE RANDOM mg/dL 148*     Results from last 7 days   Lab  Units 03/01/24  1441   INR  0.94                   Lines/Drains:  Invasive Devices       Peripheral Intravenous Line  Duration             Peripheral IV 03/01/24 Left Antecubital <1 day                    Telemetry:   Telemetry Orders (From admission, onward)               24 Hour Telemetry Monitoring  (ED Bridging Orders Panel)  Continuous x 24 Hours (Telem)        Expiring   Question:  Reason for 24 Hour Telemetry  Answer:  Syncope suspected to be cardiac in origin                        * I Have Reviewed All Lab Data Listed Above.           Imaging:     Results for orders placed during the hospital encounter of 03/01/24    XR chest 1 view portable    Narrative  XR CHEST PORTABLE    INDICATION: Syncope, chest pain and shortness of breath.    COMPARISON: Radiographs of the chest 12/17/2014    FINDINGS: Monitoring leads and clips project over the chest. Surgical clips right axilla.    Question vague density in the right suprahilar medial upper lung zone, not clearly seen on the previous study. The left lung is clear.    No pneumothorax or pleural effusion.    Normal cardiomediastinal silhouette.    Old fracture deformities of the posterior left ribs. Bones are otherwise unremarkable for age.    Normal upper abdomen.    Impression  Question vague density right suprahilar medial upper lung zone, not clearly seen on the previous study. Underlying infiltrate not excluded.    Recommend follow-up PA and lateral with dual-energy now for further assessment.    The study was marked in EPIC for immediate notification and follow-up.      Resident: MYRON HARRINGTON I, the attending radiologist, have reviewed the images and agree with the final report above.    Workstation performed: [id number]    No results found for this or any previous visit.      *I have reviewed all imaging reports listed above      Recent Cultures (last 7 days):           Last 24 Hours Medication List:   Current Facility-Administered Medications    Medication Dose Route Frequency Provider Last Rate    acetaminophen  650 mg Oral Q6H PRN James Rodriguez PA-C      aluminum-magnesium hydroxide-simethicone  30 mL Oral Q6H PRN James Rodriguez PA-C      aspirin  81 mg Oral Daily James Rodriguez PA-C      lisinopril  10 mg Oral Daily James Rodriguez PA-C      ondansetron  4 mg Intravenous Q6H PRN James Rodriguez PA-C      pravastatin  10 mg Oral Daily With Dinner James Rodriguez PA-C          Today, Patient Was Seen By: Valerie Adam MD    ** Please Note: Dictation voice to text software may have been used in the creation of this document. **

## 2024-03-03 VITALS
RESPIRATION RATE: 16 BRPM | DIASTOLIC BLOOD PRESSURE: 72 MMHG | OXYGEN SATURATION: 97 % | BODY MASS INDEX: 25.33 KG/M2 | HEIGHT: 65 IN | WEIGHT: 152 LBS | HEART RATE: 72 BPM | SYSTOLIC BLOOD PRESSURE: 168 MMHG | TEMPERATURE: 97.3 F

## 2024-03-03 LAB
ALBUMIN SERPL BCP-MCNC: 3.6 G/DL (ref 3.5–5)
ALP SERPL-CCNC: 43 U/L (ref 34–104)
ALT SERPL W P-5'-P-CCNC: 12 U/L (ref 7–52)
ANION GAP SERPL CALCULATED.3IONS-SCNC: 4 MMOL/L
AST SERPL W P-5'-P-CCNC: 16 U/L (ref 13–39)
BASOPHILS # BLD AUTO: 0.04 THOUSANDS/ÂΜL (ref 0–0.1)
BASOPHILS NFR BLD AUTO: 1 % (ref 0–1)
BILIRUB SERPL-MCNC: 0.28 MG/DL (ref 0.2–1)
BUN SERPL-MCNC: 27 MG/DL (ref 5–25)
CALCIUM SERPL-MCNC: 9.9 MG/DL (ref 8.4–10.2)
CHLORIDE SERPL-SCNC: 110 MMOL/L (ref 96–108)
CO2 SERPL-SCNC: 26 MMOL/L (ref 21–32)
CREAT SERPL-MCNC: 0.92 MG/DL (ref 0.6–1.3)
EOSINOPHIL # BLD AUTO: 0.18 THOUSAND/ÂΜL (ref 0–0.61)
EOSINOPHIL NFR BLD AUTO: 4 % (ref 0–6)
ERYTHROCYTE [DISTWIDTH] IN BLOOD BY AUTOMATED COUNT: 12.1 % (ref 11.6–15.1)
GFR SERPL CREATININE-BSD FRML MDRD: 55 ML/MIN/1.73SQ M
GLUCOSE SERPL-MCNC: 100 MG/DL (ref 65–140)
HCT VFR BLD AUTO: 34.8 % (ref 34.8–46.1)
HGB BLD-MCNC: 11.2 G/DL (ref 11.5–15.4)
IMM GRANULOCYTES # BLD AUTO: 0.02 THOUSAND/UL (ref 0–0.2)
IMM GRANULOCYTES NFR BLD AUTO: 0 % (ref 0–2)
LYMPHOCYTES # BLD AUTO: 1.96 THOUSANDS/ÂΜL (ref 0.6–4.47)
LYMPHOCYTES NFR BLD AUTO: 43 % (ref 14–44)
MCH RBC QN AUTO: 32.7 PG (ref 26.8–34.3)
MCHC RBC AUTO-ENTMCNC: 32.2 G/DL (ref 31.4–37.4)
MCV RBC AUTO: 102 FL (ref 82–98)
MONOCYTES # BLD AUTO: 0.52 THOUSAND/ÂΜL (ref 0.17–1.22)
MONOCYTES NFR BLD AUTO: 11 % (ref 4–12)
NEUTROPHILS # BLD AUTO: 1.9 THOUSANDS/ÂΜL (ref 1.85–7.62)
NEUTS SEG NFR BLD AUTO: 41 % (ref 43–75)
NRBC BLD AUTO-RTO: 0 /100 WBCS
PLATELET # BLD AUTO: 138 THOUSANDS/UL (ref 149–390)
PMV BLD AUTO: 12 FL (ref 8.9–12.7)
POTASSIUM SERPL-SCNC: 3.9 MMOL/L (ref 3.5–5.3)
PROT SERPL-MCNC: 5.7 G/DL (ref 6.4–8.4)
RBC # BLD AUTO: 3.42 MILLION/UL (ref 3.81–5.12)
SODIUM SERPL-SCNC: 140 MMOL/L (ref 135–147)
WBC # BLD AUTO: 4.62 THOUSAND/UL (ref 4.31–10.16)

## 2024-03-03 PROCEDURE — 80053 COMPREHEN METABOLIC PANEL: CPT | Performed by: HOSPITALIST

## 2024-03-03 PROCEDURE — 85025 COMPLETE CBC W/AUTO DIFF WBC: CPT | Performed by: HOSPITALIST

## 2024-03-03 PROCEDURE — 97167 OT EVAL HIGH COMPLEX 60 MIN: CPT

## 2024-03-03 PROCEDURE — 99232 SBSQ HOSP IP/OBS MODERATE 35: CPT | Performed by: INTERNAL MEDICINE

## 2024-03-03 PROCEDURE — RECHECK: Performed by: HOSPITALIST

## 2024-03-03 PROCEDURE — 99239 HOSP IP/OBS DSCHRG MGMT >30: CPT | Performed by: HOSPITALIST

## 2024-03-03 RX ADMIN — ASPIRIN 81 MG: 81 TABLET, COATED ORAL at 09:20

## 2024-03-03 RX ADMIN — LISINOPRIL 10 MG: 10 TABLET ORAL at 09:20

## 2024-03-03 NOTE — DISCHARGE SUMMARY
[facility]  Discharge- Bea Bean 1935, 88 y.o. female MRN: [id number]  Unit/Bed#: -01 Encounter: [id number]  Primary Care Provider: Aramis Raya MD   Date and time admitted to hospital: 3/1/2024  2:39 PM    CAD (coronary artery disease)  Assessment & Plan  CT chest with coronary artery atherosclerotic disease  Continue statin and aspirin    Essential hypertension  Assessment & Plan  Continue lisinopril 10mg daily    Dyslipidemia  Assessment & Plan  Continue simvastatin 10mg daily (pravastatin 10mg while inpatient)    * Syncope  Assessment & Plan  Presents with syncope, eating dinner at a restaurant, stood up and felt lightheaded, sat back down, put her head down on table and passed out briefly for several seconds. Feels fine now. Denied any nausea, sob, chest pain, or any pain preceding her syncopal episode.  Hx of near syncope in the past thought to be due to dehydration versus bradycardia  Previously had a holter monitor but per son at bedside, monitor broke apart when she tried to put it on and did not record anything.  HR 55, /90 on arrival  ECG with sinus bradycardia at 55  Orthostatics negative    Monitor on tele overnight  Cardiology consult appreciated. Plan will be for 2 week zio patch after discharge. Bradycardia noted on tele 3/2 PM, reviewed with cards, felt asymptomatic no objections to discharge          Hospital Course:     Bea Bean is a 88 y.o. female patient who originally presented to the hospital on   Admission Orders (From admission, onward)       Ordered        03/02/24 1518  Inpatient Admission  Once            03/01/24 1819  Place in Observation  Once                         due to syncope of unclear etiology.  Patient evaluated by cardiology in the setting of sinus bradycardia, however no long pauses or high degree AV blocks are noted.  Patient will follow-up for Zio patch as an outpatient.    Please see above list of diagnoses and  related plan for additional information.     Physical Exam:    GEN: No acute distress, comfortable  HEEENT: No JVD, PERRLA, no scleral icterus  RESP: Lungs clear to auscultation bilaterally  CV: RRR, +s1/s2   ABD: SOFT NON TENDER, POSITIVE BOWEL SOUNDS, NO DISTENTION  PSYCH: CALM  NEURO: Mentation baseline, NO FOCAL DEFICITS  SKIN: NO RASH  EXTREM: NO EDEMA    CONSULTING PROVIDERS   IP CONSULT TO CARDIOLOGY    PROCEDURES PERFORMED  * No surgery found *    RADIOLOGY RESULTS  CT chest without contrast    Result Date: 3/1/2024  Narrative: CT CHEST WITHOUT IV CONTRAST INDICATION: abnormality on CXR right lung. COMPARISON: Same day chest x-ray. TECHNIQUE: CT examination of the chest was performed without intravenous contrast. Multiplanar 2D reformatted images were created from the source data. This examination, like all CT scans performed in the [facility], was performed utilizing techniques to minimize radiation dose exposure, including the use of iterative reconstruction and automated exposure control. Radiation dose length product (DLP) for this visit: 204.21 mGy-cm FINDINGS: LUNGS: Tracheobronchial tree is unremarkable. Platelike atelectasis/scarring in the lingula and right middle lobe. No focal infiltrate. PLEURA: Unremarkable. There is small fat-containing right-sided diaphragmatic hernia. HEART/GREAT VESSELS: Coronary artery atherosclerotic disease. No thoracic aortic aneurysm. MEDIASTINUM AND MISAEL: Unremarkable. CHEST WALL AND LOWER NECK: Postsurgical change in the right breast and right axilla VISUALIZED STRUCTURES IN THE UPPER ABDOMEN: Right hepatic lobe cyst, the largest measures 4.8 x 3.9 cm. Partially imaged significantly distended stomach containing food debris. OSSEOUS STRUCTURES: Exaggerated thoracic kyphosis with apex at T8-9. Multiple chronic left rib cage fracture. No acute fracture or destructive osseous lesion.     Impression: 1.  No pulmonary airspace infiltrates as questioned in  prior chest x-ray. 2.  Coronary artery atherosclerotic disease. 3.  Partially imaged significantly distended stomach containing food debris. 4.  Other chronic findings as above. Workstation performed: [id number]     XR chest 1 view portable    Result Date: 3/1/2024  Narrative: XR CHEST PORTABLE INDICATION: Syncope, chest pain and shortness of breath. COMPARISON: Radiographs of the chest 12/17/2014 FINDINGS: Monitoring leads and clips project over the chest. Surgical clips right axilla. Question vague density in the right suprahilar medial upper lung zone, not clearly seen on the previous study. The left lung is clear. No pneumothorax or pleural effusion. Normal cardiomediastinal silhouette. Old fracture deformities of the posterior left ribs. Bones are otherwise unremarkable for age. Normal upper abdomen.     Impression: Question vague density right suprahilar medial upper lung zone, not clearly seen on the previous study. Underlying infiltrate not excluded. Recommend follow-up PA and lateral with dual-energy now for further assessment. The study was marked in EPIC for immediate notification and follow-up. Resident: MYRON HARRINGTON I, the attending radiologist, have reviewed the images and agree with the final report above. Workstation performed: [id number]       LABS  Results from last 7 days   Lab Units 03/03/24  0320 03/01/24  1441   WBC Thousand/uL 4.62 6.85   HEMOGLOBIN g/dL 11.2* 13.6   HEMATOCRIT % 34.8 42.9   MCV fL 102* 101*   PLATELETS Thousands/uL 138* 200   INR   --  0.94     Results from last 7 days   Lab Units 03/03/24  0320 03/01/24  1441   SODIUM mmol/L 140 141   POTASSIUM mmol/L 3.9 3.8   CHLORIDE mmol/L 110* 106   CO2 mmol/L 26 28   BUN mg/dL 27* 24   CREATININE mg/dL 0.92 1.07   CALCIUM mg/dL 9.9 10.8*   ALBUMIN g/dL 3.6 4.4   TOTAL BILIRUBIN mg/dL 0.28 0.59   ALK PHOS U/L 43 54   ALT U/L 12 11   AST U/L 16 17   EGFR ml/min/1.73sq m 55 46   GLUCOSE RANDOM mg/dL 100 148*     Results from last 7  days   Lab Units 03/01/24  1950 03/01/24  1615 03/01/24  1441   HS TNI 0HR ng/L  --   --  9   HS TNI 2HR ng/L  --  10  --    HS TNI 4HR ng/L 10  --   --                       Results from last 7 days   Lab Units 03/01/24  1441   TSH 3RD GENERATON uIU/mL 1.552               Cultures:   Results from last 7 days   Lab Units 03/01/24  1623   COLOR UA  Yellow   CLARITY UA  Clear   SPEC GRAV UA  1.025   PH UA  6.0   LEUKOCYTES UA  Negative   NITRITE UA  Negative   GLUCOSE UA mg/dl Negative   KETONES UA mg/dl Negative   BILIRUBIN UA  Negative   BLOOD UA  Negative      Results from last 7 days   Lab Units 03/01/24  1623   RBC UA /hpf None Seen   WBC UA /hpf 1-2   EPITHELIAL CELLS WET PREP /hpf None Seen   BACTERIA UA /hpf None Seen              Condition at Discharge:  good      Discharge instructions/Information to patient and family:   See after visit summary for information provided to patient and family.      Provisions for Follow-Up Care:  See after visit summary for information related to follow-up care and any pertinent home health orders.      Disposition:     Home       Discharge Statement:  I spent 32 minutes discharging the patient. This time was spent on the day of discharge. I had direct contact with the patient on the day of discharge. Greater than 50% of the total time was spent examining patient, answering all patient questions, arranging and discussing plan of care with patient as well as directly providing post-discharge instructions.  Additional time then spent on discharge activities.    Discharge Medications:  See after visit summary for reconciled discharge medications provided to patient and family.      ** Please Note: This note has been constructed using a voice recognition system **

## 2024-03-03 NOTE — PROGRESS NOTES
[facility]  Progress Note  Name: Bea Bean I  MRN: [id number]  Unit/Bed#: -01 I Date of Admission: 3/1/2024   Date of Service: 3/3/2024 I Hospital Day: 1    Assessment/Plan   CAD (coronary artery disease)  Assessment & Plan  CT chest with coronary artery atherosclerotic disease  Continue statin and aspirin    Essential hypertension  Assessment & Plan  Continue lisinopril 10mg daily    Dyslipidemia  Assessment & Plan  Continue simvastatin 10mg daily (pravastatin 10mg while inpatient)    * Syncope  Assessment & Plan  Presents with syncope, eating dinner at a restaurant, stood up and felt lightheaded, sat back down, put her head down on table and passed out briefly for several seconds. Feels fine now. Denied any nausea, sob, chest pain, or any pain preceding her syncopal episode.  Hx of near syncope in the past thought to be due to dehydration versus bradycardia  Previously had a holter monitor but per son at bedside, monitor broke apart when she tried to put it on and did not record anything.  HR 55, /90 on arrival  ECG with sinus bradycardia at 55  Orthostatics negative    Monitor on tele overnight  Cardiology consult appreciated. Plan will be for 2 week zio patch after discharge. Bradycardia noted on tele 3/2 PM, question if contributory. Will review with cards.          VTE  Prophylaxis:   Pharmacologic: in place  Mechanical VTE Prophylaxis in Place: Yes    Patient Centered Rounds: I have performed bedside rounds with nursing staff today.    Discussions with Specialists or Other Care Team Provider: case management       Mobility:   Basic Mobility Inpatient Raw Score: 24  JH-HLM Goal: 8: Walk 250 feet or more  JH-HLM Achieved: 7: Walk 25 feet or more  HLM Goal NOT achieved. Continue with multidisciplinary rounding and encourage appropriate mobility to improve upon HLM goals.    Total Time Spent on Date of Encounter in care of patient: 33 mins. This time was spent on one  or more of the following: performing physical exam; counseling and coordination of care; obtaining or reviewing history; documenting in the medical record; reviewing/ordering tests, medications or procedures; communicating with other healthcare professionals and discussing with patient's family/caregivers.      Current Length of Stay: 1 day(s)    Current Patient Status: Inpatient        Code Status: Level 1 - Full Code    Discharge Plan: Pt will require continued inpatient hospitalization.    Subjective:   Pt denies syncope  Denies dizziness chest pain or presyncope.    Patient is seen and examined at bedside.  All other ROS are negative.    Objective:     Vitals:   Temp (24hrs), Av.6 °F (36.4 °C), Min:97.3 °F (36.3 °C), Max:97.8 °F (36.6 °C)    Temp:  [97.3 °F (36.3 °C)-97.8 °F (36.6 °C)] 97.3 °F (36.3 °C)  HR:  [48-72] 72  Resp:  [16] 16  BP: (133-169)/(57-72) 168/72  SpO2:  [96 %-98 %] 97 %  Body mass index is 25.29 kg/m².     Input and Output Summary (last 24 hours):       Intake/Output Summary (Last 24 hours) at 3/3/2024 1335  Last data filed at 3/2/2024 1737  Gross per 24 hour   Intake 960 ml   Output --   Net 960 ml       Physical Exam:       GEN: No acute distress, comfortable  HEEENT: No JVD, PERRLA, no scleral icterus  RESP: Lungs clear to auscultation bilaterally  CV: RRR, +s1/s2   ABD: SOFT NON TENDER, POSITIVE BOWEL SOUNDS, NO DISTENTION  PSYCH: CALM  NEURO: Mentation baseline, NO FOCAL DEFICITS  SKIN: NO RASH  EXTREM: NO EDEMA    Additional Data:     Labs:    Results from last 7 days   Lab Units 24  0320   WBC Thousand/uL 4.62   HEMOGLOBIN g/dL 11.2*   HEMATOCRIT % 34.8   PLATELETS Thousands/uL 138*   NEUTROS PCT % 41*   LYMPHS PCT % 43   MONOS PCT % 11   EOS PCT % 4     Results from last 7 days   Lab Units 24  0320   SODIUM mmol/L 140   POTASSIUM mmol/L 3.9   CHLORIDE mmol/L 110*   CO2 mmol/L 26   BUN mg/dL 27*   CREATININE mg/dL 0.92   ANION GAP mmol/L 4   CALCIUM mg/dL 9.9   ALBUMIN  g/dL 3.6   TOTAL BILIRUBIN mg/dL 0.28   ALK PHOS U/L 43   ALT U/L 12   AST U/L 16   GLUCOSE RANDOM mg/dL 100     Results from last 7 days   Lab Units 03/01/24  1441   INR  0.94                   Lines/Drains:  Invasive Devices       Peripheral Intravenous Line  Duration             Peripheral IV 03/02/24 Distal;Dorsal (posterior);Left Forearm <1 day                    Telemetry:   Telemetry Orders (From admission, onward)               24 Hour Telemetry Monitoring  Continuous x 24 Hours (Telem)        Question:  Reason for 24 Hour Telemetry  Answer:  Syncope suspected to be cardiac in origin                        * I Have Reviewed All Lab Data Listed Above.           Imaging:     Results for orders placed during the hospital encounter of 03/01/24    XR chest 1 view portable    Narrative  XR CHEST PORTABLE    INDICATION: Syncope, chest pain and shortness of breath.    COMPARISON: Radiographs of the chest 12/17/2014    FINDINGS: Monitoring leads and clips project over the chest. Surgical clips right axilla.    Question vague density in the right suprahilar medial upper lung zone, not clearly seen on the previous study. The left lung is clear.    No pneumothorax or pleural effusion.    Normal cardiomediastinal silhouette.    Old fracture deformities of the posterior left ribs. Bones are otherwise unremarkable for age.    Normal upper abdomen.    Impression  Question vague density right suprahilar medial upper lung zone, not clearly seen on the previous study. Underlying infiltrate not excluded.    Recommend follow-up PA and lateral with dual-energy now for further assessment.    The study was marked in EPIC for immediate notification and follow-up.      Resident: MYRON HARRINGTON I, the attending radiologist, have reviewed the images and agree with the final report above.    Workstation performed: [id number]    No results found for this or any previous visit.      *I have reviewed all imaging reports listed  above      Recent Cultures (last 7 days):           Last 24 Hours Medication List:   Current Facility-Administered Medications   Medication Dose Route Frequency Provider Last Rate    acetaminophen  650 mg Oral Q6H PRN James Rodriguez PA-C      aluminum-magnesium hydroxide-simethicone  30 mL Oral Q6H PRN James Rodriguez PA-C      aspirin  81 mg Oral Daily James Rodriguez PA-C      lisinopril  10 mg Oral Daily James Rodriguez PA-C      ondansetron  4 mg Intravenous Q6H PRN James Rodriguez PA-C      pravastatin  10 mg Oral Daily With Dinner James Rodriguez PA-C          Today, Patient Was Seen By: Valerie Adam MD    ** Please Note: Dictation voice to text software may have been used in the creation of this document. **

## 2024-03-03 NOTE — PLAN OF CARE
Problem: OCCUPATIONAL THERAPY ADULT  Goal: Performs self-care activities at highest level of function for planned discharge setting.  See evaluation for individualized goals.  Description: Treatment Interventions: ADL retraining, Visual perceptual retraining, Functional transfer training, Endurance training, Patient/family training, Equipment evaluation/education, Compensatory technique education, Continued evaluation, Energy conservation, Activityengagement          See flowsheet documentation for full assessment, interventions and recommendations.   Note: Limitation: Decreased ADL status, Decreased endurance, Decreased self-care trans, Decreased high-level ADLs, Decreased Safe judgement during ADL  Prognosis: Good  Assessment: Pt is a 88 y.o. female seen for OT evaluation s/p admission to [facility] on 3/1/2024 due to syncope at [facility]. Diagnosed with Syncope. Personal and env factors supporting pt at time of IE include (I) PLOF, supportive son, and attitude towards recovery. Personal and env factors inhibiting engagement in occupations include advanced age, difficulty completing ADLs, and difficulty completing IADLs. Performance deficits that affect the pt’s occupational performance can be seen above. Due to pt's current functional limitations and medical complications pt is functioning below baseline. Pt would benefit from continued skilled OT treatment in order to maximize safety, independence and overall performance with ADLs, IADLs, functional mobility, and functional transfers in order to achieve highest level of function.     Rehab Resource Intensity Level, OT: III (Minimum Resource Intensity)

## 2024-03-03 NOTE — ASSESSMENT & PLAN NOTE
Presents with syncope, eating dinner at a restaurant, stood up and felt lightheaded, sat back down, put her head down on table and passed out briefly for several seconds. Feels fine now. Denied any nausea, sob, chest pain, or any pain preceding her syncopal episode.  Hx of near syncope in the past thought to be due to dehydration versus bradycardia  Previously had a holter monitor but per son at bedside, monitor broke apart when she tried to put it on and did not record anything.  HR 55, /90 on arrival  ECG with sinus bradycardia at 55  Orthostatics negative    Monitor on tele overnight  Cardiology consult appreciated. Plan will be for 2 week zio patch after discharge. Bradycardia noted on tele 3/2 PM, question if contributory. Will review with cards.

## 2024-03-03 NOTE — ASSESSMENT & PLAN NOTE
Presents with syncope, eating dinner at a restaurant, stood up and felt lightheaded, sat back down, put her head down on table and passed out briefly for several seconds. Feels fine now. Denied any nausea, sob, chest pain, or any pain preceding her syncopal episode.  Hx of near syncope in the past thought to be due to dehydration versus bradycardia  Previously had a holter monitor but per son at bedside, monitor broke apart when she tried to put it on and did not record anything.  HR 55, /90 on arrival  ECG with sinus bradycardia at 55  Orthostatics negative    Monitor on tele overnight  Cardiology consult appreciated. Plan will be for 2 week zio patch after discharge. Bradycardia noted on tele 3/2 PM, reviewed with cards, felt asymptomatic no objections to discharge

## 2024-03-03 NOTE — PLAN OF CARE

## 2024-03-03 NOTE — UTILIZATION REVIEW
NOTIFICATION OF INPATIENT ADMISSION   AUTHORIZATION REQUEST   SERVICING FACILITY:   [place] [address]  Tax ID: [id number]  NPI: [id number] ATTENDING PROVIDER:  Attending Name and NPI#: Valerie Adam Md [[id number]]  Address: [address] [place] [address]  Phone: [phone]   ADMISSION INFORMATION:  Place of Service: Inpatient [facility]  Place of Service Code: 21  Inpatient Admission Date/Time: 3/2/24  3:19 PM  Discharge Date/Time: No discharge date for patient encounter.  Admitting Diagnosis Code/Description:  Syncope [R55]  Sinus bradycardia [R00.1]  Known medical problems [Z78.9]     UTILIZATION REVIEW CONTACT:  Tara Rollins Utilization   Network Utilization Review Department  Phone: [phone]  Fax [phone]  Email: [email or web address]  Contact for approvals/pending authorizations, clinical reviews, and discharge.     PHYSICIAN ADVISORY SERVICES:  Medical Necessity Denial & Kjsj-ft-Wdhw Review  Phone: [phone]  Fax: [phone]  Email: [email or web address]     DISCHARGE SUPPORT TEAM:  For Patients Discharge Needs & Updates  Phone: [phone] opt. 2 Fax: [phone]  Email: [email or web address]

## 2024-03-03 NOTE — PLAN OF CARE

## 2024-03-03 NOTE — OCCUPATIONAL THERAPY NOTE
Occupational Therapy Evaluation     Patient Name: Bea Bean  Today's Date: 3/3/2024  Problem List  Principal Problem:    Syncope  Active Problems:    Dyslipidemia    Essential hypertension    CAD (coronary artery disease)    Past Medical History  Past Medical History:   Diagnosis Date    Breast cancer (HCC)     Right sided     Past Surgical History  Past Surgical History:   Procedure Laterality Date    BREAST SURGERY      EYE SURGERY  11/19/2019    Left cataract     REPLACEMENT TOTAL KNEE Left 08/04/2021    left knee placment           03/03/24 0948   OT Last Visit   OT Visit Date 03/03/24   Note Type   Note type Evaluation   Pain Assessment   Pain Assessment Tool 0-10   Pain Score No Pain   Restrictions/Precautions   Weight Bearing Precautions Per Order No   Home Living   Type of Home House   Home Layout Multi-level;Bed/bath upstairs;Stairs to enter with rails  (3 markel W RAILING)   Bathroom Shower/Tub Tub/shower unit   Bathroom Toilet Standard   Bathroom Equipment Grab bars in shower;Grab bars around toilet   Bathroom Accessibility Accessible   Home Equipment Crutches  (1 crutch and does not use anymore)   Prior Function   Level of [place] Independent with functional mobility;Independent with ADLs;Independent with IADLS   Lives With Son   Receives Help From Family   IADLs Independent with driving;Family/Friend/Other provides meals;Independent with medication management   Falls in the last 6 months 1 to 4  (just felt dizzy)   Vocational Retired  (teacher)   Lifestyle   Autonomy Pta pt was (I) w ADLs and IADLs, (+) , lives w son   Reciprocal Relationships son   Service to Others retired    Intrinsic Gratification reading   General   Additional Pertinent History Hx of CAD, hypertension, hx of snycope   Family/Caregiver Present No   Additional General Comments Pt was pleasant and cooperative   ADL   Where Assessed Chair   Eating Assistance 7  Independent   Grooming Assistance  6  Modified Independent   Grooming Deficit Wash/dry hands;Supervision/safety  (at sink)   UB Bathing Assistance 5  Supervision/Setup   LB Bathing Assistance 5  Supervision/Setup   UB Dressing Assistance 5  Supervision/Setup   LB Dressing Assistance 5  Supervision/Setup   LB Dressing Deficit Don/doff R sock;Don/doff L sock;Thread RLE into underwear;Thread LLE into underwear;Pull up over hips   Toileting Assistance  5  Supervision/Setup   Toileting Deficit Perineal hygiene;Clothing management up;Clothing management down;Increased time to complete   Bed Mobility   Supine to Sit 5  Supervision   Additional items HOB elevated;Increased time required   Additional Comments Pt OOB in recliner chair at end of session   Transfers   Sit to Stand 5  Supervision   Additional items Increased time required   Stand to Sit 5  Supervision   Additional items Increased time required;Verbal cues   Toilet transfer 4  Minimal assistance   Additional items Assist x 1;Increased time required;Standard toilet;Verbal cues   Functional Mobility   Functional Mobility 4  Minimal assistance   Additional Comments ax1, short distance w/o AD, pt declined use of AD   Additional items Hand hold assistance   Balance   Static Sitting Good   Dynamic Sitting Fair +   Static Standing Fair   Dynamic Standing Fair -   Ambulatory Fair -  (would improve w use of AD however pt declined use of AD)   Activity Tolerance   Activity Tolerance Patient tolerated treatment well   Nurse Made Aware RN aware   RUE Assessment   RUE Assessment WFL   LUE Assessment   LUE Assessment WFL   Vision-Basic Assessment   Current Vision Wears glasses all the time   Psychosocial   Psychosocial (WDL) WDL   Cognition   Overall Cognitive Status WFL   Arousal/Participation Alert;Cooperative   Attention Within functional limits   Orientation Level Oriented X4   Memory Within functional limits   Following Commands Follows all commands and directions without difficulty   Assessment    Limitation Decreased ADL status;Decreased endurance;Decreased self-care trans;Decreased high-level ADLs;Decreased Safe judgement during ADL   Prognosis Good   Assessment Pt is a 88 y.o. female seen for OT evaluation s/p admission to [facility] on 3/1/[facility]24 due to syncope at [facility]. Diagnosed with Syncope. Personal and env factors supporting pt at time of IE include (I) PLOF, supportive son, and attitude towards recovery. Personal and env factors inhibiting engagement in occupations include advanced age, difficulty completing ADLs, and difficulty completing IADLs. Performance deficits that affect the pt’s occupational performance can be seen above. Due to pt's current functional limitations and medical complications pt is functioning below baseline. Pt would benefit from continued skilled OT treatment in order to maximize safety, independence and overall performance with ADLs, IADLs, functional mobility, and functional transfers in order to achieve highest level of function.   Goals   Patient Goals to get home   LTG Time Frame 3-7   Long Term Goal GOALS      1. Pt will improve activity tolerance to G for min 30 min txment sessions for increase engagement in functional tasks      2. Pt will complete bed mobility at a Mod I level w/ G balance/safety demonstrated to decrease caregiver assistance required       3. Pt will complete UB dressing/self care w/ mod I using adaptive device and DME as needed       4. Pt will complete LB dressing/self care w/ mod I using adaptive device and DME as needed      5. Pt will complete toileting w/ mod I w/ G hygiene/thoroughness using DME as needed      6. Pt will improve functional transfers to Mod I on/off all surfaces using DME as needed w/ G balance/safety       7. Pt will improve functional mobility during ADL/IADL/leisure tasks to Mod I using DME as needed w/ G balance/safety       8. Pt will demonstrate 100% carryover of energy conservation techniques t/o functional I/ADL/leisure  tasks w/o cues s/p skilled education to increase endurance during functional tasks   Plan   Treatment Interventions ADL retraining;Visual perceptual retraining;Functional transfer training;Endurance training;Patient/family training;Equipment evaluation/education;Compensatory technique education;Continued evaluation;Energy conservation;Activityengagement   Goal Expiration Date 03/10/24   OT Treatment Day 0   OT Frequency 2-3x/wk   Discharge Recommendation   Rehab Resource Intensity Level, OT III (Minimum Resource Intensity)   Additional Comments  The patient's raw score on the AM-PAC Daily Activity Inpatient Short Form is 20. A raw score of greater than or equal to 19 suggests the patient may benefit from discharge to home. Please refer to the recommendation of the Occupational Therapist for safe discharge planning.   -PAC Daily Activity Inpatient   Lower Body Dressing 3   Bathing 3   Toileting 3   Upper Body Dressing 3   Grooming 4   Eating 4   Daily Activity Raw Score 20   Daily Activity Standardized Score (Calc for Raw Score >=11) 42.03   -[facility] Applied Cognition Inpatient   Following a Speech/Presentation 3   Understanding Ordinary Conversation 4   Taking Medications 4   Remembering Where Things Are Placed or Put Away 3   Remembering List of 4-5 Errands 3   Taking Care of Complicated Tasks 3   Applied Cognition Raw Score 20   Applied Cognition Standardized Score 41.76   End of Consult   Education Provided Yes   Patient Position at End of Consult Bedside chair;Bed/Chair alarm activated;All needs within reach   Nurse Communication Nurse aware of consult        The patient's raw score on the AM-PAC Daily Activity Inpatient Short Form is 20. A raw score of greater than or equal to 19 suggests the patient may benefit from discharge to home. Please refer to the recommendation of the Occupational Therapist for safe discharge planning.\    Eliana Stevens OTR/L

## 2024-03-03 NOTE — PROGRESS NOTES
"Progress Note - Cardiology   Bea Bean 88 y.o. female MRN: [id number]  Unit/Bed#: -01 Encounter: [id number]    Assessment:  Principal Problem:    Syncope  Active Problems:    Dyslipidemia    Essential hypertension    CAD (coronary artery disease)    Syncope, unclear etiology. Sinus bradycardia on telemetry, but no AV block or long pauses which would be absolute indication for pacemaker.    Plan:    Okay for discharge from cardiac standpoint. She should contact the office to rearrange the Zio patch which was previously attempted. If necessary, the office should be able to help place it.    Continue current medications.    Discussed with internal medicine service.    Subjective/Objective     Subjective:  No episodes of dizziness. Including when she had the sinus bradycardia. She was likely sleeping.    Tells me that when she tried to wear the monitor previously, it broke apart. She will need help from the office to place it.    Objective:    Vitals: /72   Pulse 72   Temp (!) 97.3 °F (36.3 °C)   Resp 16   Ht 5' 5\" (1.651 m)   Wt 68.9 kg (152 lb)   SpO2 97%   BMI 25.29 kg/m²   Vitals:    03/01/24 1900   Weight: 68.9 kg (152 lb)     Orthostatic Blood Pressures      Flowsheet Row Most Recent Value   Blood Pressure 168/72 filed at 03/03/2024 0958   Patient Position - Orthostatic VS Lying filed at 03/02/2024 1604              Intake/Output Summary (Last 24 hours) at 3/3/2024 1438  Last data filed at 3/2/2024 1737  Gross per 24 hour   Intake 960 ml   Output --   Net 960 ml       Physical Exam:   General appearance: alert and in no acute distress  Head: Normocephalic, without obvious abnormality, atraumatic  Neck: no carotid bruit, no JVD and supple, symmetrical, trachea midline  Lungs: clear to auscultation bilaterally. Normal air entry. Normal effort.  Heart: S1, S2 normal and no S3 or S4. No murmurs.  Abdomen: soft, non-tender; bowel sounds normal; no masses,  no organomegaly  Extremities: " extremities normal, atraumatic, no cyanosis or edema  Pulses: 2+ and symmetric bilaterally  Skin: Skin color, texture, turgor normal. No rashes or lesions  Neurologic: Grossly normal. Alert and oriented.    Medications:    Current Facility-Administered Medications:     acetaminophen (TYLENOL) tablet 650 mg, 650 mg, Oral, Q6H PRN, James Rodriguez PA-C    aluminum-magnesium hydroxide-simethicone (MAALOX) oral suspension 30 mL, 30 mL, Oral, Q6H PRN, James Rodriguez PA-C    aspirin (ECOTRIN LOW STRENGTH) EC tablet 81 mg, 81 mg, Oral, Daily, James Rodriguez PA-C, 81 mg at 03/03/24 0920    lisinopril (ZESTRIL) tablet 10 mg, 10 mg, Oral, Daily, James Rodriguez PA-C, 10 mg at 03/03/24 0920    ondansetron (ZOFRAN) injection 4 mg, 4 mg, Intravenous, Q6H PRN, James Rodriguez PA-C    pravastatin (PRAVACHOL) tablet 10 mg, 10 mg, Oral, Daily With Dinner, James Rodriguez PA-C, 10 mg at 03/02/24 1613    Lab Results:      Results from last 7 days   Lab Units 03/03/24  0320 03/01/24  1441   WBC Thousand/uL 4.62 6.85   HEMOGLOBIN g/dL 11.2* 13.6   HEMATOCRIT % 34.8 42.9   PLATELETS Thousands/uL 138* 200         Results from last 7 days   Lab Units 03/03/24  0320 03/01/24  1441   SODIUM mmol/L 140 141   POTASSIUM mmol/L 3.9 3.8   CHLORIDE mmol/L 110* 106   CO2 mmol/L 26 28   BUN mg/dL 27* 24   CREATININE mg/dL 0.92 1.07   CALCIUM mg/dL 9.9 10.8*   ALK PHOS U/L 43 54   ALT U/L 12 11   AST U/L 16 17     Results from last 7 days   Lab Units 03/01/24  1441   INR  0.94   PTT seconds 25           Telemetry: Personally reviewed. sB no pauses

## 2024-03-04 ENCOUNTER — TRANSITIONAL CARE MANAGEMENT (OUTPATIENT)
Dept: FAMILY MEDICINE CLINIC | Facility: HOSPITAL | Age: 89
End: 2024-03-04

## 2024-03-04 NOTE — UTILIZATION REVIEW
NOTIFICATION OF ADMISSION DISCHARGE   This is a Notification of Discharge from [facility]. Please be advised that this patient has been discharge from our facility. Below you will find the admission and discharge date and time including the patient’s disposition.   UTILIZATION REVIEW CONTACT:  Tara Rollins  Utilization   Network Utilization Review Department  Phone: [phone] x carefully listen to the prompts. All voicemails are confidential.  Email: [email or web address]     ADMISSION INFORMATION  PRESENTATION DATE: 3/1/2024  2:39 PM  OBERVATION ADMISSION DATE:   INPATIENT ADMISSION DATE: 3/2/24  3:19 PM   DISCHARGE DATE: 3/3/2024  5:15 PM   DISPOSITION:Home/Self Care    Network Utilization Review Department  ATTENTION: Please call with any questions or concerns to [phone] and carefully listen to the prompts so that you are directed to the right person. All voicemails are confidential.   For Discharge needs, contact Care Management DC Support Team at [phone] opt. 2  Send all requests for admission clinical reviews, approved or denied determinations and any other requests to dedicated fax number below belonging to the campus where the patient is receiving treatment. List of dedicated fax numbers for the Facilities:  FACILITY NAME UR FAX NUMBER   ADMISSION DENIALS (Administrative/Medical Necessity) [phone]   DISCHARGE SUPPORT TEAM ([facility]) [phone]   PARENT CHILD HEALTH (Maternity/NICU/Pediatrics) [phone]   [facility] [phone]   [facility] [phone]   [facility] [phone]   [facility] [phone]   Psychiatric hospital [phone]   [facility] [phone]   [facility] [phone]   [facility] [phone]   [facility]  [facility] [phone]   Counts include 234 beds at the [facility] [phone]   [facility] [phone]   [facility] [phone]

## 2024-03-08 ENCOUNTER — TELEPHONE (OUTPATIENT)
Dept: FAMILY MEDICINE CLINIC | Facility: HOSPITAL | Age: 89
End: 2024-03-08

## 2024-03-08 NOTE — TELEPHONE ENCOUNTER
This is Bea Bean and my date is birth date is [birth year]. I was told at [facility], the new one, that I need to have a monitor put on and I have tried one and it came apart. So they said they would do one in an office. So please get back to me at [phone]. Thank you very much.        I TOLD HER TO GO BACK TO THE HOSPITAL THAT THEY WOULD KNOW HOW TO FIX IT OR GET HER ANOTHER ONE.

## 2024-03-11 ENCOUNTER — TELEPHONE (OUTPATIENT)
Dept: CARDIOLOGY CLINIC | Facility: CLINIC | Age: 89
End: 2024-03-11

## 2024-03-11 NOTE — TELEPHONE ENCOUNTER
Cardiology consult notes possibly having pt wear ZIO.    Per pt, she was given two and both 'broke apart' and were not returned.     Will review w/ primary cardiologist and if it's to be redone, will have pt come to office for application.

## 2024-03-11 NOTE — PROGRESS NOTES
Assessment/Plan:    CAD (coronary artery disease)  Stable on statin and ASA  Follow up with cardiology scheduled 3/20/24    Essential hypertension  Well controlled, negative orthostatics in OV  Continue lisinopril 10mg daily.     Stage 3a chronic kidney disease (HCC)  Lab Results   Component Value Date    EGFR 55 03/03/2024    EGFR 46 03/01/2024    CREATININE 0.92 03/03/2024    CREATININE 1.07 03/01/2024    CREATININE 0.78 12/18/2014   Stable.  Avoid nephrotoxic drugs, optimize hydration/nutrition.     Platelets decreased ([facility])   Latest Reference Range & Units 03/03/24 03:20   Platelet Count 149 - 390 Thousands/uL 138 (L)   (L): Data is abnormally low    On ASA 81mg daily.  Denies s/s bleed/bruising.      Syncope  2 pre-syncopal episodes over the last few years.  This is the first syncopal episode to Ms. Bean and her son.   -now attributing to inadvertent mushroom exposure at restaurant which she is allergic to.  -denies further episodes since hospitalization.  Denies dyspnea/chest pressure/pain/nausea/HA.  -Holter 2022 with SB/ benign SVT  -orthostatics negative in OV.    -apical rate 54 at rest, 72 with standing.   -will get holter at follow up with cardiology 3/20/24    Dyslipidemia  Continue simvastatin 10mg daily along with lifestyle modifications.         Diagnoses and all orders for this visit:    Coronary artery disease without angina pectoris, unspecified vessel or lesion type, unspecified whether native or transplanted heart    Essential hypertension    Syncope, unspecified syncope type    Dyslipidemia    Elevated MCV    Platelets decreased ([facility])    Stage 3a chronic kidney disease (HCC)    Vitamin B 12 deficiency  -     Vitamin B12; Future  -     Vitamin B12    Anemia, macrocytic, nutritional  -     Folate; Future  -     Folate          Subjective:      Patient ID: Bea Bean is a 88 y.o. female.        Here s/p hospitalization for syncopal episode of unclear etiology 3/1/24-3/3/24.  EKG SB without  pause/hi grade AVB thus discharged home with Cardiology follow up for planned 2 week [facility].  Hx Holter in 2022  with SB/benign SVT.      2rd pre-syncopal episodes prior to this last syncopal episode requiring observation at [facility].      Thinks she had exposure to mushrooms at restaurant that caused syncopal episode.      Denies dizziness/lightheadedness/HA/dyspnea.  No N/V.  No neurological s/s.        SPC but none at OV.  Noted to walk down hallway using wall to steady self at times.  Denies s/s prescyncope.     Diet: Son does all the cooking, low processed foods.      Breakfast: omelet of cereal  Lunch: sandwich, sardines, veggies, fish, chicken.       Patient presents for follow-up of chronic conditions as detailed in the assessment and plan.      The following portions of the patient's history were reviewed and updated as appropriate: current medications, past family history, past medical history, past social history, past surgical history and problem list.    Review of Systems   Constitutional: Negative.  Negative for activity change, appetite change, chills, fatigue and fever.   HENT: Negative.  Negative for congestion, ear pain, hearing loss, postnasal drip, sinus pain and tinnitus.    Eyes:  Negative for visual disturbance.   Respiratory: Negative.  Negative for cough and shortness of breath.    Cardiovascular: Negative.  Negative for chest pain and leg swelling.   Gastrointestinal: Negative.  Negative for constipation and diarrhea.   Endocrine: Negative.    Genitourinary: Negative.  Negative for difficulty urinating and dysuria.   Musculoskeletal:  Positive for gait problem.   Skin: Negative.    Allergic/Immunologic: Negative.  Negative for immunocompromised state.   Neurological:  Positive for tremors. Negative for dizziness, syncope, light-headedness, numbness and headaches.   Hematological: Negative.    Psychiatric/Behavioral: Negative.           Objective:    /66 (BP Location: Left arm, Patient  Position: Standing)   Pulse 72   Wt 69.4 kg (153 lb)   SpO2 99%   BMI 25.46 kg/m²      Physical Exam  Vitals and nursing note reviewed.   Constitutional:       Appearance: Normal appearance.   HENT:      Head: Normocephalic and atraumatic.      Right Ear: Tympanic membrane, ear canal and external ear normal.      Left Ear: Tympanic membrane, ear canal and external ear normal.      Nose: Nose normal.      Mouth/Throat:      Mouth: Mucous membranes are moist.      Pharynx: Oropharynx is clear.   Eyes:      Extraocular Movements: Extraocular movements intact.      Conjunctiva/sclera: Conjunctivae normal.      Pupils: Pupils are equal, round, and reactive to light.   Cardiovascular:      Rate and Rhythm: Regular rhythm. Bradycardia present.      Pulses: Normal pulses.      Heart sounds: Normal heart sounds.      Comments: Apcial rate 54 at rest, 72 with standing.   Pulmonary:      Effort: Pulmonary effort is normal.      Breath sounds: Normal breath sounds.   Abdominal:      General: Bowel sounds are normal.      Palpations: Abdomen is soft.   Musculoskeletal:         General: Normal range of motion.      Cervical back: Normal range of motion and neck supple.   Skin:     General: Skin is warm and dry.   Neurological:      General: No focal deficit present.      Mental Status: She is alert and oriented to person, place, and time.      Gait: Gait abnormal.      Comments: Per son uses SPC, none with OV today.  Noted to place hand on wall when walking down hallway to steady self but denies s/s presyncope at this time   Psychiatric:         Mood and Affect: Mood normal.         Behavior: Behavior normal.         Thought Content: Thought content normal.         Judgment: Judgment normal.         TCM Call       Date and time call was made  3/4/2024  8:53 AM    Patient was hospitialized at  [facility]    Date of Admission  03/01/24    Date of discharge  03/03/24    Diagnosis  Syncope          TCM Call       None                MAURICIO Starr

## 2024-03-12 ENCOUNTER — OFFICE VISIT (OUTPATIENT)
Dept: FAMILY MEDICINE CLINIC | Facility: HOSPITAL | Age: 89
End: 2024-03-12
Payer: COMMERCIAL

## 2024-03-12 VITALS
BODY MASS INDEX: 25.46 KG/M2 | WEIGHT: 153 LBS | DIASTOLIC BLOOD PRESSURE: 66 MMHG | HEART RATE: 72 BPM | OXYGEN SATURATION: 99 % | SYSTOLIC BLOOD PRESSURE: 124 MMHG

## 2024-03-12 DIAGNOSIS — I25.10 CORONARY ARTERY DISEASE WITHOUT ANGINA PECTORIS, UNSPECIFIED VESSEL OR LESION TYPE, UNSPECIFIED WHETHER NATIVE OR TRANSPLANTED HEART: Primary | ICD-10-CM

## 2024-03-12 DIAGNOSIS — R55 SYNCOPE, UNSPECIFIED SYNCOPE TYPE: ICD-10-CM

## 2024-03-12 DIAGNOSIS — N18.31 STAGE 3A CHRONIC KIDNEY DISEASE (HCC): ICD-10-CM

## 2024-03-12 DIAGNOSIS — I10 ESSENTIAL HYPERTENSION: ICD-10-CM

## 2024-03-12 DIAGNOSIS — D69.6 PLATELETS DECREASED (HCC): ICD-10-CM

## 2024-03-12 DIAGNOSIS — D52.0 ANEMIA, MACROCYTIC, NUTRITIONAL: ICD-10-CM

## 2024-03-12 DIAGNOSIS — R71.8 ELEVATED MCV: ICD-10-CM

## 2024-03-12 DIAGNOSIS — E53.8 VITAMIN B 12 DEFICIENCY: ICD-10-CM

## 2024-03-12 DIAGNOSIS — E78.5 DYSLIPIDEMIA: ICD-10-CM

## 2024-03-12 PROCEDURE — 99214 OFFICE O/P EST MOD 30 MIN: CPT | Performed by: NURSE PRACTITIONER

## 2024-03-12 PROCEDURE — G2211 COMPLEX E/M VISIT ADD ON: HCPCS | Performed by: NURSE PRACTITIONER

## 2024-03-12 NOTE — ASSESSMENT & PLAN NOTE
2 pre-syncopal episodes over the last few years.  This is the first syncopal episode to Ms. Bean and her son.   -now attributing to inadvertent mushroom exposure at restaurant which she is allergic to.  -denies further episodes since hospitalization.  Denies dyspnea/chest pressure/pain/nausea/HA.  -Holter 2022 with SB/ benign SVT  -orthostatics negative in OV.    -apical rate 54 at rest, 72 with standing.   -will get holter at follow up with cardiology 3/20/24

## 2024-03-12 NOTE — ASSESSMENT & PLAN NOTE
Latest Reference Range & Units 03/03/24 03:20   Platelet Count 149 - 390 Thousands/uL 138 (L)   (L): Data is abnormally low    On ASA 81mg daily.  Denies s/s bleed/bruising.

## 2024-03-12 NOTE — ASSESSMENT & PLAN NOTE
Lab Results   Component Value Date    EGFR 55 03/03/2024    EGFR 46 03/01/2024    CREATININE 0.92 03/03/2024    CREATININE 1.07 03/01/2024    CREATININE 0.78 12/18/2014   Stable.  Avoid nephrotoxic drugs, optimize hydration/nutrition.

## 2024-04-08 ENCOUNTER — RA CDI HCC (OUTPATIENT)
Dept: OTHER | Facility: HOSPITAL | Age: 89
End: 2024-04-08

## 2024-04-16 ENCOUNTER — CLINICAL SUPPORT (OUTPATIENT)
Dept: CARDIOLOGY CLINIC | Facility: CLINIC | Age: 89
End: 2024-04-16
Payer: COMMERCIAL

## 2024-04-16 DIAGNOSIS — R55 SYNCOPE, UNSPECIFIED SYNCOPE TYPE: ICD-10-CM

## 2024-04-16 PROCEDURE — 93248 EXT ECG>7D<15D REV&INTERPJ: CPT | Performed by: INTERNAL MEDICINE

## 2024-04-19 ENCOUNTER — OFFICE VISIT (OUTPATIENT)
Dept: CARDIOLOGY CLINIC | Facility: CLINIC | Age: 89
End: 2024-04-19
Payer: COMMERCIAL

## 2024-04-19 VITALS
HEIGHT: 65 IN | SYSTOLIC BLOOD PRESSURE: 130 MMHG | HEART RATE: 67 BPM | DIASTOLIC BLOOD PRESSURE: 64 MMHG | OXYGEN SATURATION: 100 % | WEIGHT: 153 LBS | BODY MASS INDEX: 25.49 KG/M2

## 2024-04-19 DIAGNOSIS — R55 SYNCOPE, UNSPECIFIED SYNCOPE TYPE: Primary | ICD-10-CM

## 2024-04-19 DIAGNOSIS — I10 PRIMARY HYPERTENSION: ICD-10-CM

## 2024-04-19 PROCEDURE — 99214 OFFICE O/P EST MOD 30 MIN: CPT | Performed by: INTERNAL MEDICINE

## 2024-04-19 NOTE — PROGRESS NOTES
Cardiology Follow Up    Bea Bean  1935  [id number]  [facility]  Maurizio[address] KRISTEN POLLACK  [facility] [address]  [facility] [address] [phone]    No diagnosis found.    Interval History: Followup CAD, HTN.  Syncope.    No chest pain or shortness of breath. We reviewed her zio monitor.     Medical Problems       Problem List       Dyslipidemia    Essential hypertension    Age-related nuclear cataract of right eye    Abnormal EKG    Colon spasm    Syncope    CAD (coronary artery disease)    Platelets decreased (HCC)    Stage 3a chronic kidney disease (HCC)    Lab Results   Component Value Date    EGFR 55 03/03/2024    EGFR 46 03/01/2024    CREATININE 0.92 03/03/2024    CREATININE 1.07 03/01/2024    CREATININE 0.78 12/18/2014             Past Medical History:   Diagnosis Date    Breast cancer (HCC)     Right sided     Social History     Socioeconomic History    Marital status: /Civil Union     Spouse name: Not on file    Number of children: Not on file    Years of education: Not on file    Highest education level: Not on file   Occupational History    Not on file   Tobacco Use    Smoking status: Never    Smokeless tobacco: Never    Tobacco comments:     Nonsmoker - As per [employer]    Vaping Use    Vaping status: Never Used   Substance and Sexual Activity    Alcohol use: Yes     Comment: Red wine daily    Drug use: Never    Sexual activity: Not on file   Other Topics Concern    Not on file   Social History Narrative    Not on file     Social Determinants of Health     Financial Resource Strain: Not on file   Food Insecurity: Not on file   Transportation Needs: Not on file   Physical Activity: Not on file   Stress: Not on file   Social Connections: Not on file   Intimate Partner Violence: Not on file   Housing Stability: Not on file      Family History   Problem Relation Age of Onset    No Known Problems Family     Coronary artery  "disease Father      Past Surgical History:   Procedure Laterality Date    BREAST SURGERY      EYE SURGERY  11/19/2019    Left cataract     REPLACEMENT TOTAL KNEE Left 08/04/2021    left knee placment       Current Outpatient Medications:     aspirin 81 MG tablet, Take 1 tablet by mouth daily, Disp: , Rfl:     lisinopril (ZESTRIL) 10 mg tablet, Take 1 tablet (10 mg total) by mouth daily, Disp: 90 tablet, Rfl: 1    simvastatin (ZOCOR) 10 mg tablet, Take 1 tablet (10 mg total) by mouth daily, Disp: 90 tablet, Rfl: 1  Allergies   Allergen Reactions    Mushroom Extract Complex - Food Allergy Other (See Comments)     syncope       Labs:     Chemistry        Component Value Date/Time     12/18/2014 0500    K 3.9 03/03/2024 0320    K 4.8 12/18/2014 0500     (H) 03/03/2024 0320     12/18/2014 0500    CO2 26 03/03/2024 0320    CO2 27 12/18/2014 0500    BUN 27 (H) 03/03/2024 0320    BUN 23 12/18/2014 0500    CREATININE 0.92 03/03/2024 0320    CREATININE 0.78 12/18/2014 0500        Component Value Date/Time    CALCIUM 9.9 03/03/2024 0320    CALCIUM 9.6 12/18/2014 0500    ALKPHOS 43 03/03/2024 0320    ALKPHOS 93 12/18/2014 0500    AST 16 03/03/2024 0320    AST 24 12/18/2014 0500    ALT 12 03/03/2024 0320    ALT 36 12/18/2014 0500    BILITOT 0.4 12/18/2014 0500            No results found for: \"CHOL\"  No results found for: \"HDL\"  No results found for: \"LDLCALC\"  No results found for: \"TRIG\"  No results found for: \"CHOLHDL\"    Imaging: No results found.    EKG: .    Review of Systems   Constitutional: Negative.   HENT: Negative.     Eyes: Negative.    Cardiovascular: Negative.    Respiratory: Negative.     Endocrine: Negative.    Hematologic/Lymphatic: Negative.    Skin: Negative.    Musculoskeletal: Negative.    Gastrointestinal: Negative.    Genitourinary: Negative.    Neurological: Negative.    Psychiatric/Behavioral: Negative.     Allergic/Immunologic: Negative.        Vitals:    04/19/24 1557   BP: 130/64 "   Pulse: 67   SpO2: 100%           Physical Exam  Vitals and nursing note reviewed.   Constitutional:       Appearance: Normal appearance.   HENT:      Head: Normocephalic.      Nose: Nose normal.      Mouth/Throat:      Mouth: Mucous membranes are moist.   Eyes:      General: No scleral icterus.     Conjunctiva/sclera: Conjunctivae normal.   Cardiovascular:      Rate and Rhythm: Normal rate and regular rhythm.      Heart sounds: No murmur heard.     No gallop.   Pulmonary:      Effort: Pulmonary effort is normal. No respiratory distress.      Breath sounds: Normal breath sounds. No wheezing or rales.   Abdominal:      General: Abdomen is flat. Bowel sounds are normal. There is no distension.      Palpations: Abdomen is soft.      Tenderness: There is no abdominal tenderness. There is no guarding.   Musculoskeletal:      Cervical back: Normal range of motion and neck supple.      Right lower leg: No edema.      Left lower leg: No edema.   Skin:     General: Skin is warm and dry.   Neurological:      General: No focal deficit present.      Mental Status: She is alert and oriented to person, place, and time.   Psychiatric:         Mood and Affect: Mood normal.         Behavior: Behavior normal.         Discussion/Summary:    Syncope: unclear etiology. Zio reviewed. NSR with pacs. Occaisonal sinus bradycardia and slower sinus jorge luis during sleep. We discussed the importance of maintaining adequate hydration.     Recommend to monitor BP as outpatient.       The patient was counseled regarding diagnostic results, instructions for management, risk factor reductions, impressions. total time of encounter was 25 minutes and 15 minutes was spent counseling.

## 2024-10-30 DIAGNOSIS — E78.5 DYSLIPIDEMIA: ICD-10-CM

## 2024-10-30 DIAGNOSIS — I10 ESSENTIAL HYPERTENSION: ICD-10-CM

## 2024-10-31 RX ORDER — LISINOPRIL 10 MG/1
10 TABLET ORAL DAILY
Qty: 30 TABLET | Refills: 0 | Status: SHIPPED | OUTPATIENT
Start: 2024-10-31

## 2024-10-31 RX ORDER — SIMVASTATIN 10 MG
10 TABLET ORAL DAILY
Qty: 30 TABLET | Refills: 0 | Status: SHIPPED | OUTPATIENT
Start: 2024-10-31

## 2024-10-31 NOTE — TELEPHONE ENCOUNTER
Patient needs updated blood work. Please place orders. A courtesy refill was provided.   Lipid panel     Patient needs an appointment. Please contact the patient to schedule an appointment. Last office visit: 03/12/24 patient needs a 6 month appointment.

## 2024-10-31 NOTE — TELEPHONE ENCOUNTER
Kelsie on vm for patient to return call to schedule appt. JUAN/ tej - hasn't been seen since March 2024

## 2024-11-05 ENCOUNTER — OFFICE VISIT (OUTPATIENT)
Dept: CARDIOLOGY CLINIC | Facility: CLINIC | Age: 89
End: 2024-11-05
Payer: COMMERCIAL

## 2024-11-05 VITALS
HEIGHT: 65 IN | SYSTOLIC BLOOD PRESSURE: 122 MMHG | WEIGHT: 141 LBS | OXYGEN SATURATION: 97 % | BODY MASS INDEX: 23.49 KG/M2 | HEART RATE: 84 BPM | DIASTOLIC BLOOD PRESSURE: 66 MMHG

## 2024-11-05 DIAGNOSIS — I10 ESSENTIAL HYPERTENSION: ICD-10-CM

## 2024-11-05 DIAGNOSIS — E78.5 DYSLIPIDEMIA: ICD-10-CM

## 2024-11-05 DIAGNOSIS — I25.10 CORONARY ARTERY DISEASE INVOLVING NATIVE CORONARY ARTERY OF NATIVE HEART WITHOUT ANGINA PECTORIS: Primary | ICD-10-CM

## 2024-11-05 DIAGNOSIS — R55 SYNCOPE, UNSPECIFIED SYNCOPE TYPE: ICD-10-CM

## 2024-11-05 PROCEDURE — 99214 OFFICE O/P EST MOD 30 MIN: CPT | Performed by: PHYSICIAN ASSISTANT

## 2024-11-05 NOTE — PROGRESS NOTES
Cardiology Office Follow Up  Bea Bean  1935  [id number]      ASSESSMENT:  Coronary artery disease  History of syncope  Hypertension  Hyperlipidemia    Cardiac W/U:  TTE 1/9/2024:     Left Ventricle: Left ventricular cavity size is normal. Wall thickness is mildly increased. The left ventricular ejection fraction is 60% by biplane measurement. Systolic function is normal. Wall motion is normal. Diastolic function is normal for age.    Right Ventricle: Right ventricular cavity size is normal. Systolic function is normal.    Aortic Valve: There is aortic valve sclerosis.    Zio monitor 4/16/2023:   Predominant SR. No significant bradycardia or pauses. No arrhythmia.     PLAN:  Doing well CV wise.  Denies anginal equivalent.  Continue ASA 81 mg, simvastatin 10 mg daily.  Not currently on beta-blocker due to history of syncope and bradycardia.  BP stable continue lisinopril 10 mg daily; attain home BP cuff and obtain readings specially if symptomatic  Cardiac diet and adequate hydration recommended, exercise as tolerated, is ambulatory assist devices when needed.  RTO annually or sooner if needed.    Interval History/ HPI:   Bea Bean is a 89-year-old female with history of CAD, hypertension, hyperlipidemia, syncope. Follows with Dr. Joyner.  Presents for for routine 1 year follow-up visit.  Is accompanied by her son Sreekanth who cares for her.    Reports she is doing very well CV wise without overt symptoms.  No further syncopal episodes.  He has lost approximately 10 pounds over the past year however eating well. Appetite good. Hydrates well throughout the day.   Uses a cane/walker on occasion but mostly moves around on her own. No exertional issues or symptoms.     BP/heart rate stable today.  She does not have a blood pressure cuff but will obtain 1.  She has been consistent with taking her medications.  She denies all side effects.  Currently has refills ordered by her PCP.      Vitals:  122/66  84  97%  141lbs    Past Medical History:   Diagnosis Date    Breast cancer (HCC)     Right sided     Social History     Socioeconomic History    Marital status: /Civil Union     Spouse name: Not on file    Number of children: Not on file    Years of education: Not on file    Highest education level: Not on file   Occupational History    Not on file   Tobacco Use    Smoking status: Never    Smokeless tobacco: Never    Tobacco comments:     Nonsmoker - As per [employer]    Vaping Use    Vaping status: Never Used   Substance and Sexual Activity    Alcohol use: Yes     Comment: Red wine daily    Drug use: Never    Sexual activity: Not on file   Other Topics Concern    Not on file   Social History Narrative    Not on file     Social Determinants of Health     Financial Resource Strain: Not on file   Food Insecurity: Not on file   Transportation Needs: Not on file   Physical Activity: Not on file   Stress: Not on file   Social Connections: Not on file   Intimate Partner Violence: Not on file   Housing Stability: Not on file      Family History   Problem Relation Age of Onset    No Known Problems Family     Coronary artery disease Father      Past Surgical History:   Procedure Laterality Date    BREAST SURGERY      EYE SURGERY  11/19/2019    Left cataract     REPLACEMENT TOTAL KNEE Left 08/04/2021    left knee placment       Current Outpatient Medications:     aspirin 81 MG tablet, Take 1 tablet by mouth daily, Disp: , Rfl:     lisinopril (ZESTRIL) 10 mg tablet, Take 1 tablet by mouth once daily, Disp: 30 tablet, Rfl: 0    simvastatin (ZOCOR) 10 mg tablet, Take 1 tablet by mouth once daily, Disp: 30 tablet, Rfl: 0      Review of Systems:  Review of Systems   Constitutional:  Negative for appetite change, chills, diaphoresis, fatigue and fever.   Respiratory:  Negative for cough, chest tightness and shortness of breath.    Cardiovascular:  Negative for chest pain, palpitations and leg swelling.    Gastrointestinal:  Negative for diarrhea, nausea and vomiting.   Endocrine: Negative for cold intolerance and heat intolerance.   Genitourinary:  Negative for difficulty urinating, dysuria and enuresis.   Musculoskeletal:  Negative for arthralgias, back pain and gait problem.   Allergic/Immunologic: Negative for environmental allergies and food allergies.   Neurological:  Negative for dizziness, facial asymmetry and headaches.   Hematological:  Negative for adenopathy. Does not bruise/bleed easily.   Psychiatric/Behavioral:  Negative for agitation, behavioral problems and confusion.          Physical Exam:  Physical Exam  Constitutional:       Appearance: She is well-developed.   HENT:      Right Ear: External ear normal.      Left Ear: External ear normal.   Eyes:      Pupils: Pupils are equal, round, and reactive to light.   Cardiovascular:      Rate and Rhythm: Normal rate and regular rhythm.      Heart sounds: Normal heart sounds. No murmur heard.     No friction rub. No gallop.   Pulmonary:      Effort: Pulmonary effort is normal.      Breath sounds: Normal breath sounds.   Abdominal:      Palpations: Abdomen is soft.   Musculoskeletal:         General: Normal range of motion.      Cervical back: Normal range of motion.   Skin:     General: Skin is warm and dry.   Neurological:      Mental Status: She is alert and oriented to person, place, and time.      Deep Tendon Reflexes: Reflexes are normal and symmetric.   Psychiatric:         Behavior: Behavior normal.         Thought Content: Thought content normal.         Judgment: Judgment normal.         This note was completed in part utilizing M-Modal Fluency Direct Software.  Grammatical errors, random word insertions, spelling mistakes, and incomplete sentences can be an occasional consequence of this system secondary to software limitations, ambient noise, and hardware issues.  If you have any questions or concerns about the content, text, or information  contained within the body of this dictation, please contact the provider for clarification.

## 2024-11-06 NOTE — TELEPHONE ENCOUNTER
BLANK THIS WAS THE 3RD CALL I MADE TO THIS PATIENT AND SHE HASN'T RETURNED CALL TO SCHEDULE APPT.  JUST AN FYI

## 2024-11-20 ENCOUNTER — TELEPHONE (OUTPATIENT)
Age: 89
End: 2024-11-20

## 2024-12-31 ENCOUNTER — OFFICE VISIT (OUTPATIENT)
Dept: FAMILY MEDICINE CLINIC | Facility: HOSPITAL | Age: 89
End: 2024-12-31
Payer: COMMERCIAL

## 2024-12-31 VITALS
HEART RATE: 67 BPM | WEIGHT: 138.4 LBS | DIASTOLIC BLOOD PRESSURE: 74 MMHG | HEIGHT: 65 IN | SYSTOLIC BLOOD PRESSURE: 118 MMHG | TEMPERATURE: 97.7 F | BODY MASS INDEX: 23.06 KG/M2

## 2024-12-31 DIAGNOSIS — I10 ESSENTIAL HYPERTENSION: Primary | ICD-10-CM

## 2024-12-31 DIAGNOSIS — I25.10 CORONARY ARTERY DISEASE INVOLVING NATIVE CORONARY ARTERY OF NATIVE HEART WITHOUT ANGINA PECTORIS: ICD-10-CM

## 2024-12-31 DIAGNOSIS — Z00.00 MEDICARE ANNUAL WELLNESS VISIT, SUBSEQUENT: ICD-10-CM

## 2024-12-31 DIAGNOSIS — E78.5 DYSLIPIDEMIA: ICD-10-CM

## 2024-12-31 PROCEDURE — G0439 PPPS, SUBSEQ VISIT: HCPCS | Performed by: NURSE PRACTITIONER

## 2024-12-31 PROCEDURE — 99214 OFFICE O/P EST MOD 30 MIN: CPT | Performed by: NURSE PRACTITIONER

## 2024-12-31 NOTE — PATIENT INSTRUCTIONS
Medicare Preventive Visit Patient Instructions  Thank you for completing your Welcome to Medicare Visit or Medicare Annual Wellness Visit today. Your next wellness visit will be due in one year (1/1/2026).  The screening/preventive services that you may require over the next 5-10 years are detailed below. Some tests may not apply to you based off risk factors and/or age. Screening tests ordered at today's visit but not completed yet may show as past due. Also, please note that scanned in results may not display below.  Preventive Screenings:  Service Recommendations Previous Testing/Comments   Colorectal Cancer Screening  * Colonoscopy    * Fecal Occult Blood Test (FOBT)/Fecal Immunochemical Test (FIT)  * Fecal DNA/Cologuard Test  * Flexible Sigmoidoscopy Age: 45-75 years old   Colonoscopy: every 10 years (may be performed more frequently if at higher risk)  OR  FOBT/FIT: every 1 year  OR  Cologuard: every 3 years  OR  Sigmoidoscopy: every 5 years  Screening may be recommended earlier than age 45 if at higher risk for colorectal cancer. Also, an individualized decision between you and your healthcare provider will decide whether screening between the ages of 76-85 would be appropriate. Colonoscopy: Not on file  FOBT/FIT: Not on file  Cologuard: Not on file  Sigmoidoscopy: Not on file    Screening Not Indicated     Breast Cancer Screening Age: 40+ years old  Frequency: every 1-2 years  Not required if history of left and right mastectomy Mammogram: Not on file    History Breast Cancer   Cervical Cancer Screening Between the ages of 21-29, pap smear recommended once every 3 years.   Between the ages of 30-65, can perform pap smear with HPV co-testing every 5 years.   Recommendations may differ for women with a history of total hysterectomy, cervical cancer, or abnormal pap smears in past. Pap Smear: Not on file    Screening Not Indicated   Hepatitis C Screening Once for adults born between 1945 and 1965  More  frequently in patients at high risk for Hepatitis C Hep C Antibody: Not on file        Diabetes Screening 1-2 times per year if you're at risk for diabetes or have pre-diabetes Fasting glucose: No results in last 5 years (No results in last 5 years)  A1C: No results in last 5 years (No results in last 5 years)  Screening Current   Cholesterol Screening Once every 5 years if you don't have a lipid disorder. May order more often based on risk factors. Lipid panel: Not on file          Other Preventive Screenings Covered by Medicare:  Abdominal Aortic Aneurysm (AAA) Screening: covered once if your at risk. You're considered to be at risk if you have a family history of AAA.  Lung Cancer Screening: covers low dose CT scan once per year if you meet all of the following conditions: (1) Age 55-77; (2) No signs or symptoms of lung cancer; (3) Current smoker or have quit smoking within the last 15 years; (4) You have a tobacco smoking history of at least 20 pack years (packs per day multiplied by number of years you smoked); (5) You get a written order from a healthcare provider.  Glaucoma Screening: covered annually if you're considered high risk: (1) You have diabetes OR (2) Family history of glaucoma OR (3)  aged 50 and older OR (4)  American aged 65 and older  Osteoporosis Screening: covered every 2 years if you meet one of the following conditions: (1) You're estrogen deficient and at risk for osteoporosis based off medical history and other findings; (2) Have a vertebral abnormality; (3) On glucocorticoid therapy for more than 3 months; (4) Have primary hyperparathyroidism; (5) On osteoporosis medications and need to assess response to drug therapy.   Last bone density test (DXA Scan): Not on file.  HIV Screening: covered annually if you're between the age of 15-65. Also covered annually if you are younger than 15 and older than 65 with risk factors for HIV infection. For pregnant patients, it  is covered up to 3 times per pregnancy.    Immunizations:  Immunization Recommendations   Influenza Vaccine Annual influenza vaccination during flu season is recommended for all persons aged >= 6 months who do not have contraindications   Pneumococcal Vaccine   * Pneumococcal conjugate vaccine = PCV13 (Prevnar 13), PCV15 (Vaxneuvance), PCV20 (Prevnar 20)  * Pneumococcal polysaccharide vaccine = PPSV23 (Pneumovax) Adults 19-65 yo with certain risk factors or if 65+ yo  If never received any pneumonia vaccine: recommend Prevnar 20 (PCV20)  Give PCV20 if previously received 1 dose of PCV13 or PPSV23   Hepatitis B Vaccine 3 dose series if at intermediate or high risk (ex: diabetes, end stage renal disease, liver disease)   Respiratory syncytial virus (RSV) Vaccine - COVERED BY MEDICARE PART D  * RSVPreF3 (Arexvy) CDC recommends that adults 60 years of age and older may receive a single dose of RSV vaccine using shared clinical decision-making (SCDM)   Tetanus (Td) Vaccine - COST NOT COVERED BY MEDICARE PART B Following completion of primary series, a booster dose should be given every 10 years to maintain immunity against tetanus. Td may also be given as tetanus wound prophylaxis.   Tdap Vaccine - COST NOT COVERED BY MEDICARE PART B Recommended at least once for all adults. For pregnant patients, recommended with each pregnancy.   Shingles Vaccine (Shingrix) - COST NOT COVERED BY MEDICARE PART B  2 shot series recommended in those 19 years and older who have or will have weakened immune systems or those 50 years and older     Health Maintenance Due:      Topic Date Due   • DXA SCAN  Never done     Immunizations Due:      Topic Date Due   • Influenza Vaccine (1) 09/01/2024   • COVID-19 Vaccine (4 - 2024-25 season) 09/01/2024     Advance Directives   What are advance directives?  Advance directives are legal documents that state your wishes and plans for medical care. These plans are made ahead of time in case you lose  your ability to make decisions for yourself. Advance directives can apply to any medical decision, such as the treatments you want, and if you want to donate organs.   What are the types of advance directives?  There are many types of advance directives, and each state has rules about how to use them. You may choose a combination of any of the following:  Living will:  This is a written record of the treatment you want. You can also choose which treatments you do not want, which to limit, and which to stop at a certain time. This includes surgery, medicine, IV fluid, and tube feedings.   Durable power of  for healthcare (DPAHC):  This is a written record that states who you want to make healthcare choices for you when you are unable to make them for yourself. This person, called a proxy, is usually a family member or a friend. You may choose more than 1 proxy.  Do not resuscitate (DNR) order:  A DNR order is used in case your heart stops beating or you stop breathing. It is a request not to have certain forms of treatment, such as CPR. A DNR order may be included in other types of advance directives.  Medical directive:  This covers the care that you want if you are in a coma, near death, or unable to make decisions for yourself. You can list the treatments you want for each condition. Treatment may include pain medicine, surgery, blood transfusions, dialysis, IV or tube feedings, and a ventilator (breathing machine).  Values history:  This document has questions about your views, beliefs, and how you feel and think about life. This information can help others choose the care that you would choose.  Why are advance directives important?  An advance directive helps you control your care. Although spoken wishes may be used, it is better to have your wishes written down. Spoken wishes can be misunderstood, or not followed. Treatments may be given even if you do not want them. An advance directive may make it  easier for your family to make difficult choices about your care.   Urinary Incontinence   Urinary incontinence (UI)  is when you lose control of your bladder. UI develops because your bladder cannot store or empty urine properly. The 3 most common types of UI are stress incontinence, urge incontinence, or both.  Medicines:   May be given to help strengthen your bladder control. Report any side effects of medication to your healthcare provider.  Do pelvic muscle exercises often:  Your pelvic muscles help you stop urinating. Squeeze these muscles tight for 5 seconds, then relax for 5 seconds. Gradually work up to squeezing for 10 seconds. Do 3 sets of 15 repetitions a day, or as directed. This will help strengthen your pelvic muscles and improve bladder control.  Train your bladder:  Go to the bathroom at set times, such as every 2 hours, even if you do not feel the urge to go. You can also try to hold your urine when you feel the urge to go. For example, hold your urine for 5 minutes when you feel the urge to go. As that becomes easier, hold your urine for 10 minutes.   Self-care:   Keep a UI record.  Write down how often you leak urine and how much you leak. Make a note of what you were doing when you leaked urine.  Drink liquids as directed. You may need to limit the amount of liquid you drink to help control your urine leakage. Do not drink any liquid right before you go to bed. Limit or do not have drinks that contain caffeine or alcohol.   Prevent constipation.  Eat a variety of high-fiber foods. Good examples are high-fiber cereals, beans, vegetables, and whole-grain breads. Walking is the best way to trigger your intestines to have a bowel movement.  Exercise regularly and maintain a healthy weight.  Weight loss and exercise will decrease pressure on your bladder and help you control your leakage.   Use a catheter as directed  to help empty your bladder. A catheter is a tiny, plastic tube that is put into  your bladder to drain your urine.   Go to behavior therapy as directed.  Behavior therapy may be used to help you learn to control your urge to urinate.     © Copyright [employer] 2018 Information is for End User's use only and may not be sold, redistributed or otherwise used for commercial purposes. All illustrations and images included in CareNotes® are the copyrighted property of [employer] or "[employer]

## 2024-12-31 NOTE — PROGRESS NOTES
Name: Bea Bean      : 1935      MRN: [id number]  Encounter Provider: MAURICIO Arriaga  Encounter Date: 2024   Encounter department: [facility] [address]     Assessment & Plan  Essential hypertension  BP well controlled  Continue same meds as rx'd  Update labs as ordered  Return in 1 year    Orders:    CBC and differential; Future    Comprehensive metabolic panel; Future    Dyslipidemia  Compliant w/statin as rx'd  Update labs as ordered    Orders:    CBC and differential; Future    Comprehensive metabolic panel; Future    TSH, 3rd generation with Free T4 reflex; Future    Lipid Panel with Direct LDL reflex; Future    Medicare annual wellness visit, subsequent  AWV updated, next due in 1 year       Coronary artery disease involving native coronary artery of native heart without angina pectoris  Clinically stable/asymptomatic  Established with and managed by cardiology          Preventive health issues were discussed with patient, and age appropriate screening tests were ordered as noted in patient's After Visit Summary. Personalized health advice and appropriate referrals for health education or preventive services given if needed, as noted in patient's After Visit Summary.    History of Present Illness         Here for AWV with Son (Ed). She states she has been well and denies concerns. She states he cooks for her and she eats all he prepares. States she takes meds daily as rx'd and rarely forget.   Follows with cardiology regularly.          Patient Care Team:  MAURICIO Arriaga as PCP - General (Family Medicine)  Junior Joyner MD      Review of Systems   Constitutional: Negative.    Respiratory: Negative.     Cardiovascular: Negative.    Psychiatric/Behavioral:  Negative for dysphoric mood.          Medical History Reviewed by provider this encounter:  Tobacco  Allergies  Meds  Problems  Med Hx  Surg Hx  Fam Hx       Annual Wellness Visit Questionnaire    Bea is here for her Subsequent Wellness visit. Last Medicare Wellness visit information reviewed, patient interviewed and updates made to the record today.      Health Risk Assessment:   Patient rates overall health as fair. Patient feels that their physical health rating is same. Patient is satisfied with their life. Eyesight was rated as same. Hearing was rated as same. Patient feels that their emotional and mental health rating is same. Patients states they are never, rarely angry. Pain experienced in the last 7 days has been none. Patient states that she has experienced no weight loss or gain in last 6 months.     Fall Risk Screening:   In the past year, patient has experienced: no history of falling in past year      Urinary Incontinence Screening:   Patient has leaked urine accidently in the last six months.     Home Safety:  Patient does not have trouble with stairs inside or outside of their home. Patient has working smoke alarms and has working carbon monoxide detector. Home safety hazards include: none.     Nutrition:   Current diet is Regular.     Medications:   Patient is not currently taking any over-the-counter supplements. Patient is able to manage medications.     Activities of Daily Living (ADLs)/Instrumental Activities of Daily Living (IADLs):   Walk and transfer into and out of bed and chair?: Yes  Dress and groom yourself?: Yes    Bathe or shower yourself?: Yes    Feed yourself? Yes  Do your laundry/housekeeping?: Yes  Manage your money, pay your bills and track your expenses?: No  Make your own meals?: No    Do your own shopping?: No    Previous Hospitalizations:   Any hospitalizations or ED visits within the last 12 months?: Yes    How many hospitalizations have you had in the last year?: 1-2    Advance Care Planning:   Living will: Yes    Durable POA for healthcare: Yes    Advanced directive: Yes      PREVENTIVE SCREENINGS      Cardiovascular Screening:    General: Risks and Benefits  "Discussed    Due for: Lipid Panel      Diabetes Screening:     General: Screening Current      Colorectal Cancer Screening:     General: Screening Not Indicated      Breast Cancer Screening:     General: History Breast Cancer      Cervical Cancer Screening:    General: Screening Not Indicated      Osteoporosis Screening:    General: Screening Not Indicated      Abdominal Aortic Aneurysm (AAA) Screening:        General: Screening Not Indicated      Lung Cancer Screening:     General: Screening Not Indicated      Hepatitis C Screening:    General: Screening Not Indicated    Screening, Brief Intervention, and Referral to Treatment (SBIRT)    Screening      Single Item Drug Screening:  How often have you used an illegal drug (including marijuana) or a prescription medication for non-medical reasons in the past year? never    Single Item Drug Screen Score: 0  Interpretation: Negative screen for possible drug use disorder    Social Drivers of Health     Food Insecurity: No Food Insecurity (12/31/2024)    Hunger Vital Sign     Worried About Running Out of Food in the Last Year: Never true     Ran Out of Food in the Last Year: Never true   Transportation Needs: No Transportation Needs (12/31/2024)    PRAPARE - Transportation     Lack of Transportation (Medical): No     Lack of Transportation (Non-Medical): No   Housing Stability: Low Risk  (12/31/2024)    Housing Stability Vital Sign     Unable to Pay for Housing in the Last Year: No     Number of Times Moved in the Last Year: 0     Homeless in the Last Year: No   Utilities: Not At Risk (12/31/2024)    [facility] Utilities     Threatened with loss of utilities: No     Vision Screening    Right eye Left eye Both eyes   Without correction      With correction 20/30 20/30 20/30       Objective   /74   Pulse 67   Temp 97.7 °F (36.5 °C)   Ht 5' 5\" (1.651 m)   Wt 62.8 kg (138 lb 6.4 oz)   BMI 23.03 kg/m²       Physical Exam  Vitals reviewed.   Constitutional:       General: " She is not in acute distress.  HENT:      Head: Normocephalic.   Eyes:      General: No scleral icterus.  Neck:      Thyroid: No thyromegaly.      Vascular: No carotid bruit.   Cardiovascular:      Rate and Rhythm: Normal rate and regular rhythm.   Pulmonary:      Effort: Pulmonary effort is normal. No respiratory distress.      Breath sounds: Normal breath sounds.   Musculoskeletal:      Cervical back: Normal range of motion.      Right lower leg: No edema.      Left lower leg: No edema.   Lymphadenopathy:      Cervical: No cervical adenopathy.   Skin:     General: Skin is warm and dry.   Neurological:      General: No focal deficit present.      Mental Status: She is alert and oriented to person, place, and time.   Psychiatric:         Mood and Affect: Mood normal.         Speech: Speech normal.         Behavior: Behavior normal. Behavior is cooperative.         Thought Content: Thought content normal.         Cognition and Memory: Cognition normal.         Judgment: Judgment normal.         Administrative Statements   I have spent a total time of 20 minutes in caring for this patient on the day of the visit/encounter including Instructions for management, Patient and family education, Importance of tx compliance, Risk factor reductions, Impressions, Counseling / Coordination of care, Documenting in the medical record, Reviewing / ordering tests, medicine, procedures  , and Obtaining or reviewing history

## 2024-12-31 NOTE — ASSESSMENT & PLAN NOTE
Compliant w/statin as rx'd  Update labs as ordered    Orders:    CBC and differential; Future    Comprehensive metabolic panel; Future    TSH, 3rd generation with Free T4 reflex; Future    Lipid Panel with Direct LDL reflex; Future

## 2024-12-31 NOTE — ASSESSMENT & PLAN NOTE
BP well controlled  Continue same meds as rx'd  Update labs as ordered  Return in 1 year    Orders:    CBC and differential; Future    Comprehensive metabolic panel; Future

## 2025-01-01 LAB
ALBUMIN SERPL-MCNC: 4.3 G/DL (ref 3.6–5.1)
ALBUMIN/GLOB SERPL: 1.8 (CALC) (ref 1–2.5)
ALP SERPL-CCNC: 71 U/L (ref 37–153)
ALT SERPL-CCNC: 14 U/L (ref 6–29)
AST SERPL-CCNC: 18 U/L (ref 10–35)
BASOPHILS # BLD AUTO: 62 CELLS/UL (ref 0–200)
BASOPHILS NFR BLD AUTO: 1.3 %
BILIRUB SERPL-MCNC: 0.8 MG/DL (ref 0.2–1.2)
BUN SERPL-MCNC: 27 MG/DL (ref 7–25)
BUN/CREAT SERPL: 30 (CALC) (ref 6–22)
CALCIUM SERPL-MCNC: 10.5 MG/DL (ref 8.6–10.4)
CHLORIDE SERPL-SCNC: 104 MMOL/L (ref 98–110)
CHOLEST SERPL-MCNC: 208 MG/DL
CHOLEST/HDLC SERPL: 3.2 (CALC)
CO2 SERPL-SCNC: 26 MMOL/L (ref 20–32)
CREAT SERPL-MCNC: 0.89 MG/DL (ref 0.6–0.95)
EOSINOPHIL # BLD AUTO: 230 CELLS/UL (ref 15–500)
EOSINOPHIL NFR BLD AUTO: 4.8 %
ERYTHROCYTE [DISTWIDTH] IN BLOOD BY AUTOMATED COUNT: 11.2 % (ref 11–15)
GFR/BSA.PRED SERPLBLD CYS-BASED-ARV: 62 ML/MIN/1.73M2
GLOBULIN SER CALC-MCNC: 2.4 G/DL (CALC) (ref 1.9–3.7)
GLUCOSE SERPL-MCNC: 96 MG/DL (ref 65–99)
HCT VFR BLD AUTO: 38.8 % (ref 35–45)
HDLC SERPL-MCNC: 66 MG/DL
HGB BLD-MCNC: 13.3 G/DL (ref 11.7–15.5)
LDLC SERPL CALC-MCNC: 125 MG/DL (CALC)
LYMPHOCYTES # BLD AUTO: 1214 CELLS/UL (ref 850–3900)
LYMPHOCYTES NFR BLD AUTO: 25.3 %
MCH RBC QN AUTO: 32.8 PG (ref 27–33)
MCHC RBC AUTO-ENTMCNC: 34.3 G/DL (ref 32–36)
MCV RBC AUTO: 95.8 FL (ref 80–100)
MONOCYTES # BLD AUTO: 442 CELLS/UL (ref 200–950)
MONOCYTES NFR BLD AUTO: 9.2 %
NEUTROPHILS # BLD AUTO: 2851 CELLS/UL (ref 1500–7800)
NEUTROPHILS NFR BLD AUTO: 59.4 %
NONHDLC SERPL-MCNC: 142 MG/DL (CALC)
PLATELET # BLD AUTO: 180 THOUSAND/UL (ref 140–400)
PMV BLD REES-ECKER: 11.5 FL (ref 7.5–12.5)
POTASSIUM SERPL-SCNC: 4.1 MMOL/L (ref 3.5–5.3)
PROT SERPL-MCNC: 6.7 G/DL (ref 6.1–8.1)
RBC # BLD AUTO: 4.05 MILLION/UL (ref 3.8–5.1)
SODIUM SERPL-SCNC: 139 MMOL/L (ref 135–146)
TRIGL SERPL-MCNC: 75 MG/DL
TSH SERPL-ACNC: 1.34 MIU/L (ref 0.4–4.5)
WBC # BLD AUTO: 4.8 THOUSAND/UL (ref 3.8–10.8)

## 2025-01-02 ENCOUNTER — RESULTS FOLLOW-UP (OUTPATIENT)
Dept: FAMILY MEDICINE CLINIC | Facility: HOSPITAL | Age: OVER 89
End: 2025-01-02

## 2025-03-23 DIAGNOSIS — I10 ESSENTIAL HYPERTENSION: ICD-10-CM

## 2025-03-23 DIAGNOSIS — E78.5 DYSLIPIDEMIA: ICD-10-CM

## 2025-03-25 RX ORDER — LISINOPRIL 10 MG/1
10 TABLET ORAL DAILY
Qty: 90 TABLET | Refills: 1 | Status: SHIPPED | OUTPATIENT
Start: 2025-03-25

## 2025-03-25 RX ORDER — SIMVASTATIN 10 MG
10 TABLET ORAL DAILY
Qty: 90 TABLET | Refills: 1 | Status: SHIPPED | OUTPATIENT
Start: 2025-03-25